# Patient Record
Sex: MALE | Race: WHITE | Employment: FULL TIME | URBAN - METROPOLITAN AREA
[De-identification: names, ages, dates, MRNs, and addresses within clinical notes are randomized per-mention and may not be internally consistent; named-entity substitution may affect disease eponyms.]

---

## 2017-03-07 ENCOUNTER — HOSPITAL ENCOUNTER (OUTPATIENT)
Dept: PHYSICAL THERAPY | Age: 32
Discharge: HOME | End: 2017-03-07
Payer: COMMERCIAL

## 2017-03-07 PROCEDURE — 97162 PT EVAL MOD COMPLEX 30 MIN: CPT

## 2017-03-07 PROCEDURE — 97110 THERAPEUTIC EXERCISES: CPT

## 2017-03-07 NOTE — PROGRESS NOTES
Guillermina Factor  : 1985 [facility]  [address], [address], [place]  [phone]   [phone]          OUTPATIENT PHYSICAL THERAPY:Initial Assessment 3/7/2017    ICD-10: Treatment Diagnosis: right leg pain (M79.604)  Right hip pain (M25.551), sprain of sacroiliac joint (M46.08)  Precautions/Allergies:   Penicillins   Fall Risk Score: 1 (? 5 = High Risk)  MD Orders: evaluate and treat MEDICAL/REFERRING DIAGNOSIS:  Right Leg Pain   Right Hip Pain    DATE OF ONSET: 2016  REFERRING PHYSICIAN: Kasia Linda MD  RETURN PHYSICIAN APPOINTMENT: not specified      INITIAL ASSESSMENT:  Mr. Jada Escoto presents with funcitonal limitations due to right hip and LE pain. Through evaluation, pt demonstrates weakness of hip and stability musculature, mild leg length discrepancy and possible innominate rotation/upslip. He would highly benefit from skilled PT to address below problems and return to regular activity level. PROBLEM LIST (Impacting functional limitations):  1. Decreased Strength  2. Decreased ADL/Functional Activities  3. Increased Pain  4. Decreased Activity Tolerance  5. Decreased Flexibility/Joint Mobility  6. Decreased [place] with Home Exercise Program INTERVENTIONS PLANNED:  1. Cold  2. Heat  3. Home Exercise Program (HEP)  4. Manual Therapy  5. Neuromuscular Re-education/Strengthening  6. Range of Motion (ROM)  7. Therapeutic Exercise/Strengthening   TREATMENT PLAN:  Effective Dates: 3/7/17 TO 17. Frequency/Duration: 2 times a week for 2-4 weeks  GOALS: (Goals have been discussed and agreed upon with patient.)  SHORT-TERM FUNCTIONAL GOALS: Time Frame: 2-4 weeks   1. Pt will be independent with HEP focusing on core stability and promoting good spinal alignment. 2. Pt will report no symptoms of LE (distal of hip joint) for 1-2 weeks demonstrating centralization of symptoms.   3. Pt will report pain level does not exceed 5/10 in a 1-2 week period of time to demonstrate pain management. DISCHARGE GOALS: Time Frame: 4-6 weeks   1. Pt will improve LEFS score by at least 10 points   2. Pt will demonstrate full AROM of right hip all planes without report of pain to improve functional mobility. 3. Pt will be able to sustain regular exercise/recreational activities including aerobic exercise 3+ times per week without increased symptoms. 4. Pt will demonstrate 5/5 strength of R hip all planes. Rehabilitation Potential For Stated Goals: Good  Regarding [facility] Bunting therapy, I certify that the treatment plan above will be carried out by a therapist or under their direction. Thank you for this referral,  Seamus Fuller PT     Referring Physician Signature: Ang John MD              Date                    The information in this section was collected on 3/7/17 (except where otherwise noted). HISTORY:   History of Present Injury/Illness (Reason for Referral):   pt reports that he began feeling right hip, groin, and leg pain October 2016 after a long back packing trip in which he was carrying heavy equipment and some times pulling equipment behind him. The pian continued over last few months and has limited his recreational and job activities. He reports he treated with massage therapy as well as chiropractic adjustments that brought temporary relief only. He was referred to orthopedics, MRI of lumbar region negative. Pt also reports testicular pain that seems to coincide with these symptoms and is currently undergoing urology work up as well. Present symptoms (on day of initial evaluation): constant pain, aching and sharp pain of right lateral hip/pelvis radiating to groin, knee and lower leg. Denies any numbness, weakness, balance disturbance or falling.    · Aggravating factors: driving, sitting, walking uneven ground, standing   · Relieving factors: rest, AM   · Pain level: 7/10 presently, 9/10 worst, 1/10 worst     Past Medical History/Comorbidities:   Mr. Michael Lowry  has a past medical history of ADD (attention deficit disorder) and Anxiety. Mr. Michael Lowry  has a past surgical history that includes cholecystectomy and lap,cholecystectomy.   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, involving driving and heavy lifting    Recreational: Mountain biking   Dominant Side:         RIGHT  Other Clinical Tests:          MRI of lumbar spine        Urology workup  Previous Treatment Approaches:          Massage therapy, chiropractic, injections     Personal Factors:          Coping Style: anxiety  Current Medications:       Current Outpatient Prescriptions:     busPIRone (BUSPAR) 7.5 mg tablet, Take 1 Tab by mouth two (2) times a day., Disp: 60 Tab, Rfl: 5   Date Last Reviewed:  3/7/2017     Number of Personal Factors/Comorbidities that affect the Plan of Care: 1-2: MODERATE COMPLEXITY   EXAMINATION:   Observation/Orthostatic Postural Assessment:    ·       Left iliac crest and PSIS elevated in standing compared to right   ·       Right LE longer than left in supine position   Palpation:    · Left PSIS more superficial than right   · Tight paraspinal and QL left   · Tender with right PSIS mobilization   · Sacrum appears symmetrical   ROM:            Lumbar spine Date:  3/7/17 Date:   Date:     Direction  Parameters Parameters Parameters   Flexion  100%     Extension  100%     Rotation  R: 100%  L: 100%     Side bending  R: 100%  L: 100%     Hip IR R: Mild limited with groin pain at end range  L: WNL     Hip ER R: WNL   L: WNL     iliospoas R: normal   L: tight      ITB R: normal   L: normal              Strength:            Lower quadrant    DATE  3/7/17 DATE     Hip flexion R: 4   L: 5 R:   L:    Hip Abduction  R: 4  L: 4+ R:   L:    Hip Extension  R: 4+  L:  4+ R:   L:    Knee Flexion  R: 5  L: 5 R:   L:    Knee Extension  R: 5  L: 5 R:   L:    Ankle Dorsiflexion  R: 5  L:5 R:   L:   Ankle Plantarflexion  R:5  L:5 R:  L:          Special Tests:          Marchers test (+) right --suggesting right SIJ stiff/dysfuncitonal    Neurological Screen:        Normal   Functional Mobility:         Gait/Ambulation:  Right ER hip during stance phase         Transfers:  Normal         Bed Mobility:  Normal         Stairs:  Normal    Body Structures Involved:  1. Joints  2. Muscles  3. Ligaments Body Functions Affected:  1. Sensory/Pain  2. Neuromusculoskeletal  3. Movement Related Activities and Participation Affected:  1. Mobility   Number of elements that affect the Plan of Care: 3: MODERATE COMPLEXITY   CLINICAL PRESENTATION:   Presentation: Evolving clinical presentation with changing clinical characteristics: MODERATE COMPLEXITY   CLINICAL DECISION MAKING:   Outcome Measure: Tool Used: Modified Oswestry Low Back Pain Questionnaire  Score:  Initial: 21/50  Most Recent: X/50 (Date: -- )   Interpretation of Score: Each section is scored on a 0-5 scale, 5 representing the greatest disability. The scores of each section are added together for a total score of 50. Medical Necessity:   · Patient is expected to demonstrate progress in strength and range of motion to increase independence with recreational and work activities . Reason for Services/Other Comments:  · Patient continues to require modification of therapeutic interventions to increase complexity of exercises.    Use of outcome tool(s) and clinical judgement create a POC that gives a: Questionable prediction of patient's progress: MODERATE COMPLEXITY            TREATMENT:   (In addition to Assessment/Re-Assessment sessions the following treatments were rendered)  Pre-treatment Symptoms/Complaints:  See above   Pain: Initial:     7/10 entire hip to lower leg Post Session:  4-6/10 localized to right hip/pelvis      THERAPEUTIC EXERCISE: (10 minutes):  Exercises per grid below to improve mobility and strength. Required moderate visual, verbal, manual and tactile cues to promote proper body alignment, promote proper body posture and promote proper body mechanics. Progressed resistance, range and repetitions as indicated. Date:  3/7/17 Date:   Date:     Activity/Exercise Parameters Parameters Parameters   shotgun 5, 10 sec holds and demo-do for home     iso hip flexion  Next visit      nustep or bike  Next visit     shuttle Next visit                        Manual:   · Release of QL B   · Manual resist to shot gun technique     Treatment/Session Assessment:  Focused on decreasing pelvic asymmetry   · Response to Treatment:  Decrease and localization of symptoms after treatment today. · Compliance with Program/Exercises: Will assess as treatment progresses. · Recommendations/Intent for next treatment session: \"Next visit will focus on advancements to more challenging activities\".     Future Appointments  Date Time Provider Vishal Rodriguez   3/9/2017 10:45 AM Maite Reach, PT [facility]   3/15/2017 9:15 AM Maite Reach, PT [facility]   3/17/2017 8:30 AM Maite Reach, PT [facility]   3/20/2017 11:00 AM Barak Silver MD [facility] PST PST   3/21/2017 10:00 AM Maite Reach, PT [facility]   3/23/2017 10:45 AM Maite Reach, PT [facility]   3/27/2017 7:45 AM Maite Reach, PT [facility]   3/30/2017 7:45 AM Maite Reach, PT [facility]       Total Treatment Duration:  PT Patient Time In/Time Out  Time In: 1045  Time Out: [address], PT

## 2017-03-07 NOTE — PROGRESS NOTES
Ambulatory/Rehab Services H2 Model Falls Risk Assessment    Risk Factor Pts. ·   Confusion/Disorientation/Impulsivity  []    4 ·   Symptomatic Depression  []   2 ·   Altered Elimination  []   1 ·   Dizziness/Vertigo  []   1 ·   Gender (Male)  [x]   1 ·   Any administered antiepileptics (anticonvulsants):  []   2 ·   Any administered benzodiazepines:  []   1 ·   Visual Impairment (specify):  []   1 ·   Portable Oxygen Use  []   1 ·   Orthostatic ? BP  []   1 ·   History of Recent Falls (within 3 mos.)  []   5     Ability to Rise from Chair (choose one) Pts. ·   Ability to rise in a single movement  [x]   0 ·   Pushes up, successful in one attempt  []   1 ·   Multiple attempts, but successful  []   3 ·   Unable to rise without assistance  []   4   Total: (5 or greater = High Risk) 0     Falls Prevention Plan:   []                Physical Limitations to Exercise (specify):   []                Mobility Assistance Device (type):   []                Exercise/Equipment Adaptation (specify):    ©2010 [facility] [employer] Patent #[id number].  Federal Law prohibits the replication, distribution or use without written permission from [facility] [employer]

## 2017-03-09 ENCOUNTER — HOSPITAL ENCOUNTER (OUTPATIENT)
Dept: PHYSICAL THERAPY | Age: 32
Discharge: HOME | End: 2017-03-09
Payer: COMMERCIAL

## 2017-03-09 PROCEDURE — 97110 THERAPEUTIC EXERCISES: CPT

## 2017-03-09 NOTE — PROGRESS NOTES
Anne Connolly  : 1985 [facility] at [facility]  [address], [address], [place] [address]  [phone]   [phone]          OUTPATIENT PHYSICAL THERAPY:Daily Note 3/9/2017    ICD-10: Treatment Diagnosis: right leg pain (M79.604)  Right hip pain (M25.551), sprain of sacroiliac joint (M46.08)  Precautions/Allergies:   Penicillins   Fall Risk Score: 1 (? 5 = High Risk)  MD Orders: evaluate and treat MEDICAL/REFERRING DIAGNOSIS:  Right Leg Pain   Right Hip Pain    DATE OF ONSET: 2016  REFERRING PHYSICIAN: Anamaria Renteria MD  RETURN PHYSICIAN APPOINTMENT: not specified      INITIAL ASSESSMENT:  Mr. Familia Case presents with funcitonal limitations due to right hip and LE pain. Through evaluation, pt demonstrates weakness of hip and stability musculature, mild leg length discrepancy and possible innominate rotation/upslip. He would highly benefit from skilled PT to address below problems and return to regular activity level. PROBLEM LIST (Impacting functional limitations):  1. Decreased Strength  2. Decreased ADL/Functional Activities  3. Increased Pain  4. Decreased Activity Tolerance  5. Decreased Flexibility/Joint Mobility  6. Decreased [place] with Home Exercise Program INTERVENTIONS PLANNED:  1. Cold  2. Heat  3. Home Exercise Program (HEP)  4. Manual Therapy  5. Neuromuscular Re-education/Strengthening  6. Range of Motion (ROM)  7. Therapeutic Exercise/Strengthening   TREATMENT PLAN:  Effective Dates: 3/7/17 TO 17. Frequency/Duration: 2 times a week for 2-4 weeks  GOALS: (Goals have been discussed and agreed upon with patient.)  SHORT-TERM FUNCTIONAL GOALS: Time Frame: 2-4 weeks   1. Pt will be independent with HEP focusing on core stability and promoting good spinal alignment. 2. Pt will report no symptoms of LE (distal of hip joint) for 1-2 weeks demonstrating centralization of symptoms.   3. Pt will report pain level does not exceed 5/10 in a 1-2 week period of time to demonstrate pain management. DISCHARGE GOALS: Time Frame: 4-6 weeks   1. Pt will improve LEFS score by at least 10 points   2. Pt will demonstrate full AROM of right hip all planes without report of pain to improve functional mobility. 3. Pt will be able to sustain regular exercise/recreational activities including aerobic exercise 3+ times per week without increased symptoms. 4. Pt will demonstrate 5/5 strength of R hip all planes. Rehabilitation Potential For Stated Goals: Good  Regarding [place] therapy, I certify that the treatment plan above will be carried out by a therapist or under their direction. Thank you for this referral,  Roberto Solorio PT     Referring Physician Signature: Una Wallace MD              Date                    The information in this section was collected on 3/7/17 (except where otherwise noted). HISTORY:   History of Present Injury/Illness (Reason for Referral):   pt reports that he began feeling right hip, groin, and leg pain October 2016 after a long back packing trip in which he was carrying heavy equipment and some times pulling equipment behind him. The pian continued over last few months and has limited his recreational and job activities. He reports he treated with massage therapy as well as chiropractic adjustments that brought temporary relief only. He was referred to orthopedics, MRI of lumbar region negative. Pt also reports testicular pain that seems to coincide with these symptoms and is currently undergoing urology work up as well. Present symptoms (on day of initial evaluation): constant pain, aching and sharp pain of right lateral hip/pelvis radiating to groin, knee and lower leg. Denies any numbness, weakness, balance disturbance or falling.    · Aggravating factors: driving, sitting, walking uneven ground, standing   · Relieving factors: rest, AM   · Pain level: 7/10 presently, 9/10 worst, 1/10 worst Past Medical History/Comorbidities:   Mr. Charlee William  has a past medical history of ADD (attention deficit disorder) and Anxiety. Mr. Charlee William  has a past surgical history that includes cholecystectomy and lap,cholecystectomy.   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, involving driving and heavy lifting    Recreational: Mountain biking   Dominant Side:         RIGHT  Other Clinical Tests:          MRI of lumbar spine        Urology workup  Previous Treatment Approaches:          Massage therapy, chiropractic, injections     Personal Factors:          Coping Style: anxiety  Current Medications:       Current Outpatient Prescriptions:     busPIRone (BUSPAR) 7.5 mg tablet, Take 1 Tab by mouth two (2) times a day., Disp: 60 Tab, Rfl: 5   Date Last Reviewed:  3/9/2017     Number of Personal Factors/Comorbidities that affect the Plan of Care: 1-2: MODERATE COMPLEXITY   EXAMINATION:   Observation/Orthostatic Postural Assessment:    ·       Left iliac crest and PSIS elevated in standing compared to right   ·       Right LE longer than left in supine position   Palpation:    · Left PSIS more superficial than right   · Tight paraspinal and QL left   · Tender with right PSIS mobilization   · Sacrum appears symmetrical   ROM:            Lumbar spine Date:  3/7/17 Date:   Date:     Direction  Parameters Parameters Parameters   Flexion  100%     Extension  100%     Rotation  R: 100%  L: 100%     Side bending  R: 100%  L: 100%     Hip IR R: Mild limited with groin pain at end range  L: WNL     Hip ER R: WNL   L: WNL     iliospoas R: normal   L: tight      ITB R: normal   L: normal              Strength:            Lower quadrant    DATE  3/7/17 DATE     Hip flexion R: 4   L: 5 R:   L:    Hip Abduction  R: 4  L: 4+ R:   L:    Hip Extension  R: 4+  L:  4+ R:   L:    Knee Flexion R: 5  L: 5 R:   L:    Knee Extension  R: 5  L: 5 R:   L:    Ankle Dorsiflexion  R: 5  L:5 R:   L:   Ankle Plantarflexion  R:5  L:5 R:  L:          Special Tests:          Marchers test (+) right --suggesting right SIJ stiff/dysfuncitonal    Neurological Screen:        Normal   Functional Mobility:         Gait/Ambulation:  Right ER hip during stance phase         Transfers:  Normal         Bed Mobility:  Normal         Stairs:  Normal    Body Structures Involved:  1. Joints  2. Muscles  3. Ligaments Body Functions Affected:  1. Sensory/Pain  2. Neuromusculoskeletal  3. Movement Related Activities and Participation Affected:  1. Mobility   Number of elements that affect the Plan of Care: 3: MODERATE COMPLEXITY   CLINICAL PRESENTATION:   Presentation: Evolving clinical presentation with changing clinical characteristics: MODERATE COMPLEXITY   CLINICAL DECISION MAKING:   Outcome Measure: Tool Used: Modified Oswestry Low Back Pain Questionnaire  Score:  Initial: 21/50  Most Recent: X/50 (Date: -- )   Interpretation of Score: Each section is scored on a 0-5 scale, 5 representing the greatest disability. The scores of each section are added together for a total score of 50. Medical Necessity:   · Patient is expected to demonstrate progress in strength and range of motion to increase independence with recreational and work activities . Reason for Services/Other Comments:  · Patient continues to require modification of therapeutic interventions to increase complexity of exercises.    Use of outcome tool(s) and clinical judgement create a POC that gives a: Questionable prediction of patient's progress: MODERATE COMPLEXITY            TREATMENT:   (In addition to Assessment/Re-Assessment sessions the following treatments were rendered)  Pre-treatment Symptoms/Complaints: less pain and more localized since last session      Pain: Initial:     3-5/10 entire hip to knee  Post Session:  0/10 at end of session THERAPEUTIC EXERCISE: (40 minutes):  Exercises per grid below to improve mobility and strength. Required moderate visual, verbal, manual and tactile cues to promote proper body alignment, promote proper body posture and promote proper body mechanics. Progressed resistance, range and repetitions as indicated. Date:  3/7/17 Date:  3/9/17 Date:     Activity/Exercise Parameters Parameters Parameters   shotgun 5, 10 sec holds and demo-do for home Home     iso hip flexion  Next visit  3 x 20 sec     nustep or bike  Next visit Bike x 10 min level 2     shuttle Next visit  Next     Right knee to chest /left leg off table   With manual resist to correct rotation of pelvis     Side gliding left standing   3 x 3 sec hold     Bridge with ball squeeze   X 5, 10 sec hold     Manual:   · None performed    Modalities; Ice x 10 min low back/SIJ  Treatment/Session Assessment:  Focused on decreasing pelvic asymmetry and stabilization exercises   · Response to Treatment:  Decrease and localization of symptoms after treatment today. · Compliance with Program/Exercises: Will assess as treatment progresses. · Recommendations/Intent for next treatment session: \"Next visit will focus on advancements to more challenging activities\".     Future Appointments  Date Time Provider Vishal Rodriguez   3/15/2017 9:15 AM Cecil Maldonado, PT [facility]   3/17/2017 8:30 AM Skiberry Maldonado, PT [facility]   3/20/2017 11:00 AM Raymundo Castillo MD [facility] PST PST   3/21/2017 10:00 AM Skilydiay Maldonado, PT [facility]   3/23/2017 10:45 AM Skippy Maldonado, PT [facility]   3/27/2017 7:45 AM Skilydiay Maldonado, PT [facility]   3/30/2017 7:45 AM Skilydiay Maldonado, PT [facility]       Total Treatment Duration:  PT Patient Time In/Time Out  Time In: 1045  Time Out: [address], PT

## 2017-03-15 ENCOUNTER — HOSPITAL ENCOUNTER (OUTPATIENT)
Dept: PHYSICAL THERAPY | Age: 32
Discharge: HOME | End: 2017-03-15
Payer: COMMERCIAL

## 2017-03-15 PROCEDURE — 97110 THERAPEUTIC EXERCISES: CPT

## 2017-03-15 PROCEDURE — 97140 MANUAL THERAPY 1/> REGIONS: CPT

## 2017-03-15 NOTE — PROGRESS NOTES
Betzy Mac  : 1985 [facility]  [address], [address], [place]  [phone]   [phone]          OUTPATIENT PHYSICAL THERAPY:Daily Note 3/15/2017    ICD-10: Treatment Diagnosis: right leg pain (M79.604)  Right hip pain (M25.551), sprain of sacroiliac joint (M46.08)  Precautions/Allergies:   Penicillins   Fall Risk Score: 1 (? 5 = High Risk)  MD Orders: evaluate and treat MEDICAL/REFERRING DIAGNOSIS:  Right Leg Pain   Right Hip Pain    DATE OF ONSET: 2016  REFERRING PHYSICIAN: Ramirez Cabrera MD  RETURN PHYSICIAN APPOINTMENT: not specified      INITIAL ASSESSMENT:  Mr. Ya Felix presents with funcitonal limitations due to right hip and LE pain. Through evaluation, pt demonstrates weakness of hip and stability musculature, mild leg length discrepancy and possible innominate rotation/upslip. He would highly benefit from skilled PT to address below problems and return to regular activity level. PROBLEM LIST (Impacting functional limitations):  1. Decreased Strength  2. Decreased ADL/Functional Activities  3. Increased Pain  4. Decreased Activity Tolerance  5. Decreased Flexibility/Joint Mobility  6. Decreased [place] with Home Exercise Program INTERVENTIONS PLANNED:  1. Cold  2. Heat  3. Home Exercise Program (HEP)  4. Manual Therapy  5. Neuromuscular Re-education/Strengthening  6. Range of Motion (ROM)  7. Therapeutic Exercise/Strengthening   TREATMENT PLAN:  Effective Dates: 3/7/17 TO 17. Frequency/Duration: 2 times a week for 2-4 weeks  GOALS: (Goals have been discussed and agreed upon with patient.)  SHORT-TERM FUNCTIONAL GOALS: Time Frame: 2-4 weeks   1. Pt will be independent with HEP focusing on core stability and promoting good spinal alignment. 2. Pt will report no symptoms of LE (distal of hip joint) for 1-2 weeks demonstrating centralization of symptoms.   3. Pt will report pain level does not exceed 5/10 in a 1-2 week period of time to demonstrate pain management. DISCHARGE GOALS: Time Frame: 4-6 weeks   1. Pt will improve LEFS score by at least 10 points   2. Pt will demonstrate full AROM of right hip all planes without report of pain to improve functional mobility. 3. Pt will be able to sustain regular exercise/recreational activities including aerobic exercise 3+ times per week without increased symptoms. 4. Pt will demonstrate 5/5 strength of R hip all planes. Rehabilitation Potential For Stated Goals: Good  Regarding [facility] Section therapy, I certify that the treatment plan above will be carried out by a therapist or under their direction. Thank you for this referral,  Shane Cannon PT     Referring Physician Signature: Latanya Orr MD              Date                    The information in this section was collected on 3/7/17 (except where otherwise noted). HISTORY:   History of Present Injury/Illness (Reason for Referral):   pt reports that he began feeling right hip, groin, and leg pain October 2016 after a long back packing trip in which he was carrying heavy equipment and some times pulling equipment behind him. The pian continued over last few months and has limited his recreational and job activities. He reports he treated with massage therapy as well as chiropractic adjustments that brought temporary relief only. He was referred to orthopedics, MRI of lumbar region negative. Pt also reports testicular pain that seems to coincide with these symptoms and is currently undergoing urology work up as well. Present symptoms (on day of initial evaluation): constant pain, aching and sharp pain of right lateral hip/pelvis radiating to groin, knee and lower leg. Denies any numbness, weakness, balance disturbance or falling.    · Aggravating factors: driving, sitting, walking uneven ground, standing   · Relieving factors: rest, AM   · Pain level: 7/10 presently, 9/10 worst, 1/10 worst Past Medical History/Comorbidities:   Mr. Lauro Moreno  has a past medical history of ADD (attention deficit disorder) and Anxiety. Mr. Lauro Moreno  has a past surgical history that includes cholecystectomy and lap,cholecystectomy.   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, involving driving and heavy lifting    Recreational: Mountain biking   Dominant Side:         RIGHT  Other Clinical Tests:          MRI of lumbar spine        Urology workup  Previous Treatment Approaches:          Massage therapy, chiropractic, injections     Personal Factors:          Coping Style: anxiety  Current Medications:       Current Outpatient Prescriptions:     busPIRone (BUSPAR) 7.5 mg tablet, Take 1 Tab by mouth two (2) times a day., Disp: 60 Tab, Rfl: 5   Date Last Reviewed:  3/15/2017     EXAMINATION:   Observation/Orthostatic Postural Assessment:    ·       Left iliac crest and PSIS elevated in standing compared to right   ·       Right LE longer than left in supine position   Palpation:    · Left PSIS more superficial than right   · Tight paraspinal and QL left   · Tender with right PSIS mobilization   · Sacrum appears symmetrical   ROM:            Lumbar spine Date:  3/7/17 Date:   Date:     Direction  Parameters Parameters Parameters   Flexion  100%     Extension  100%     Rotation  R: 100%  L: 100%     Side bending  R: 100%  L: 100%     Hip IR R: Mild limited with groin pain at end range  L: WNL     Hip ER R: WNL   L: WNL     iliospoas R: normal   L: tight      ITB R: normal   L: normal              Strength:            Lower quadrant    DATE  3/7/17 DATE     Hip flexion R: 4   L: 5 R:   L:    Hip Abduction  R: 4  L: 4+ R:   L:    Hip Extension  R: 4+  L:  4+ R:   L:    Knee Flexion  R: 5  L: 5 R:   L:    Knee Extension  R: 5  L: 5 R:   L:    Ankle Dorsiflexion  R: 5  L:5 R:   L: Ankle Plantarflexion  R:5  L:5 R:  L:          Special Tests:          Marchers test (+) right --suggesting right SIJ stiff/dysfuncitonal    Neurological Screen:        Normal   Functional Mobility:         Gait/Ambulation:  Right ER hip during stance phase         Transfers:  Normal         Bed Mobility:  Normal         Stairs:  Normal    CLINICAL DECISION MAKING:   Outcome Measure: Tool Used: Modified Oswestry Low Back Pain Questionnaire  Score:  Initial: 21/50  Most Recent: X/50 (Date: -- )   Interpretation of Score: Each section is scored on a 0-5 scale, 5 representing the greatest disability. The scores of each section are added together for a total score of 50. Medical Necessity:   · Patient is expected to demonstrate progress in strength and range of motion to increase independence with recreational and work activities . Reason for Services/Other Comments:  · Patient continues to require modification of therapeutic interventions to increase complexity of exercises. TREATMENT:   (In addition to Assessment/Re-Assessment sessions the following treatments were rendered)  Pre-treatment Symptoms/Complaints:pt states he drove to [place] and had increased pain after 30 min of driving to a 0/39 of right hip, thigh and knee. Pain: Initial:     4/10 entire hip to knee  Post Session:  1/10 at end of session      THERAPEUTIC EXERCISE: (30 minutes):  Exercises per grid below to improve mobility and strength. Required moderate visual, verbal, manual and tactile cues to promote proper body alignment, promote proper body posture and promote proper body mechanics. Progressed resistance, range and repetitions as indicated.    Date:  3/7/17 Date:  3/9/17 Date:  3/15/17   Activity/Exercise Parameters Parameters Parameters   shotgun 5, 10 sec holds and demo-do for home Home  HEP    iso hip flexion  Next visit  3 x 20 sec  3 x 20 sec    nustep or bike  Next visit Bike x 10 min level 2  Bike x 10 min level 3   shuttle Next visit  Next  Next    Right knee to chest /left leg off table   With manual resist to correct rotation of pelvis  See manual    Side gliding left standing   3 x 3 sec hold  ----hep---   Bridge with ball squeeze   X 5, 10 sec hold  5 x 10 sec    Foot tap over bolster (SLS balance)   Lateral x 10 B  forwd x 20 B    Side stepping with band   GTB 1 lap    Manual: 15 min   · Grade III medial hip rotation x 5 bouts   · Contract relax innominate correction (to correct right anterior rotation) 3 x 5 sec, 2 sets    Modalities; Ice x 10 min low back/SIJ    Treatment/Session Assessment:  Focused on decreasing pelvic asymmetry and stabilization exercises   · Response to Treatment:  No pain at end of session. leg length symmetrical after manual   · Compliance with Program/Exercises: Will assess as treatment progresses. · Recommendations/Intent for next treatment session: \"Next visit will focus on advancements to more challenging activities\".     Future Appointments  Date Time Provider Vishal Rodriguez   3/17/2017 8:30 AM Patricia Swanson, PT [facility]   3/20/2017 11:00 AM MD ABEBA Moulton PST PST   3/21/2017 7:45 AM Elinore Bump, PT [facility]   3/24/2017 8:30 AM Elianibalre Kiki, PT [facility]   3/27/2017 7:45 AM Elianibalre Bump, PT [facility]   3/30/2017 7:45 AM Elianibalre Normamp, PT [facility]       Total Treatment Duration:  PT Patient Time In/Time Out  Time In: 0915  Time Out: [address] [facility], PT

## 2017-03-17 ENCOUNTER — HOSPITAL ENCOUNTER (OUTPATIENT)
Dept: PHYSICAL THERAPY | Age: 32
Discharge: HOME | End: 2017-03-17
Payer: COMMERCIAL

## 2017-03-17 PROCEDURE — 97140 MANUAL THERAPY 1/> REGIONS: CPT

## 2017-03-17 PROCEDURE — 97110 THERAPEUTIC EXERCISES: CPT

## 2017-03-17 NOTE — PROGRESS NOTES
Torie Walis  : 1985 [facility]  [address], [address], [place]  [phone]   [phone]          OUTPATIENT PHYSICAL THERAPY:Daily Note 3/17/2017    ICD-10: Treatment Diagnosis: right leg pain (M79.604)  Right hip pain (M25.551), sprain of sacroiliac joint (M46.08)  Precautions/Allergies:   Penicillins   Fall Risk Score: 1 (? 5 = High Risk)  MD Orders: evaluate and treat MEDICAL/REFERRING DIAGNOSIS:  Right Leg Pain   Right Hip Pain    DATE OF ONSET: 2016  REFERRING PHYSICIAN: Eliana Rodarte MD  RETURN PHYSICIAN APPOINTMENT: not specified      INITIAL ASSESSMENT:  Mr. Sebas Hanna presents with funcitonal limitations due to right hip and LE pain. Through evaluation, pt demonstrates weakness of hip and stability musculature, mild leg length discrepancy and possible innominate rotation/upslip. He would highly benefit from skilled PT to address below problems and return to regular activity level. PROBLEM LIST (Impacting functional limitations):  1. Decreased Strength  2. Decreased ADL/Functional Activities  3. Increased Pain  4. Decreased Activity Tolerance  5. Decreased Flexibility/Joint Mobility  6. Decreased [place] with Home Exercise Program INTERVENTIONS PLANNED:  1. Cold  2. Heat  3. Home Exercise Program (HEP)  4. Manual Therapy  5. Neuromuscular Re-education/Strengthening  6. Range of Motion (ROM)  7. Therapeutic Exercise/Strengthening   TREATMENT PLAN:  Effective Dates: 3/7/17 TO 17. Frequency/Duration: 2 times a week for 2-4 weeks  GOALS: (Goals have been discussed and agreed upon with patient.)  SHORT-TERM FUNCTIONAL GOALS: Time Frame: 2-4 weeks   1. Pt will be independent with HEP focusing on core stability and promoting good spinal alignment. 2. Pt will report no symptoms of LE (distal of hip joint) for 1-2 weeks demonstrating centralization of symptoms.   3. Pt will report pain level does not exceed 5/10 in a 1-2 week period of time to demonstrate pain management. DISCHARGE GOALS: Time Frame: 4-6 weeks   1. Pt will improve LEFS score by at least 10 points   2. Pt will demonstrate full AROM of right hip all planes without report of pain to improve functional mobility. 3. Pt will be able to sustain regular exercise/recreational activities including aerobic exercise 3+ times per week without increased symptoms. 4. Pt will demonstrate 5/5 strength of R hip all planes. Rehabilitation Potential For Stated Goals: Good  Regarding Mary Rg therapy, I certify that the treatment plan above will be carried out by a therapist or under their direction. Thank you for this referral,  Amber Miller, PT     Referring Physician Signature: Roosevelt Valenzuela MD              Date                    The information in this section was collected on 3/7/17 (except where otherwise noted). HISTORY:   History of Present Injury/Illness (Reason for Referral):   pt reports that he began feeling right hip, groin, and leg pain October 2016 after a long back packing trip in which he was carrying heavy equipment and some times pulling equipment behind him. The pian continued over last few months and has limited his recreational and job activities. He reports he treated with massage therapy as well as chiropractic adjustments that brought temporary relief only. He was referred to orthopedics, MRI of lumbar region negative. Pt also reports testicular pain that seems to coincide with these symptoms and is currently undergoing urology work up as well. Present symptoms (on day of initial evaluation): constant pain, aching and sharp pain of right lateral hip/pelvis radiating to groin, knee and lower leg. Denies any numbness, weakness, balance disturbance or falling.    · Aggravating factors: driving, sitting, walking uneven ground, standing   · Relieving factors: rest, AM   · Pain level: 7/10 presently, 9/10 worst, 1/10 worst Past Medical History/Comorbidities:   Mr. Fahad Dyson  has a past medical history of ADD (attention deficit disorder) and Anxiety. Mr. Fahad Dyson  has a past surgical history that includes cholecystectomy and lap,cholecystectomy.   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, involving driving and heavy lifting    Recreational: Mountain biking   Dominant Side:         RIGHT  Other Clinical Tests:          MRI of lumbar spine        Urology workup  Previous Treatment Approaches:          Massage therapy, chiropractic, injections     Personal Factors:          Coping Style: anxiety  Current Medications:       Current Outpatient Prescriptions:     busPIRone (BUSPAR) 7.5 mg tablet, Take 1 Tab by mouth two (2) times a day., Disp: 60 Tab, Rfl: 5   Date Last Reviewed:  3/17/2017     EXAMINATION:   Observation/Orthostatic Postural Assessment:    ·       Left iliac crest and PSIS elevated in standing compared to right   ·       Right LE longer than left in supine position   Palpation:    · Left PSIS more superficial than right   · Tight paraspinal and QL left   · Tender with right PSIS mobilization   · Sacrum appears symmetrical   ROM:            Lumbar spine Date:  3/7/17 Date:   Date:     Direction  Parameters Parameters Parameters   Flexion  100%     Extension  100%     Rotation  R: 100%  L: 100%     Side bending  R: 100%  L: 100%     Hip IR R: Mild limited with groin pain at end range  L: WNL     Hip ER R: WNL   L: WNL     iliospoas R: normal   L: tight      ITB R: normal   L: normal              Strength:            Lower quadrant    DATE  3/7/17 DATE     Hip flexion R: 4   L: 5 R:   L:    Hip Abduction  R: 4  L: 4+ R:   L:    Hip Extension  R: 4+  L:  4+ R:   L:    Knee Flexion  R: 5  L: 5 R:   L:    Knee Extension  R: 5  L: 5 R:   L:    Ankle Dorsiflexion  R: 5  L:5 R:   L: Ankle Plantarflexion  R:5  L:5 R:  L:          Special Tests:          Marchers test (+) right --suggesting right SIJ stiff/dysfuncitonal    Neurological Screen:        Normal   Functional Mobility:         Gait/Ambulation:  Right ER hip during stance phase         Transfers:  Normal         Bed Mobility:  Normal         Stairs:  Normal    CLINICAL DECISION MAKING:   Outcome Measure: Tool Used: Modified Oswestry Low Back Pain Questionnaire  Score:  Initial: 21/50  Most Recent: X/50 (Date: -- )   Interpretation of Score: Each section is scored on a 0-5 scale, 5 representing the greatest disability. The scores of each section are added together for a total score of 50. Medical Necessity:   · Patient is expected to demonstrate progress in strength and range of motion to increase independence with recreational and work activities . Reason for Services/Other Comments:  · Patient continues to require modification of therapeutic interventions to increase complexity of exercises. TREATMENT:   (In addition to Assessment/Re-Assessment sessions the following treatments were rendered)  Pre-treatment Symptoms/Complaints:pt states he felt good the rest of the day after last session but he drove around town yesterday and pain increased to a 6/10. Pain: Initial:     5/10 entire hip to knee  Post Session:  1/10 at end of session      THERAPEUTIC EXERCISE: (10 minutes):  Exercises per grid below to improve mobility and strength. Required moderate visual, verbal, manual and tactile cues to promote proper body alignment, promote proper body posture and promote proper body mechanics. Progressed resistance, range and repetitions as indicated.    Date:  3/7/17 Date:  3/9/17 Date:  3/15/17 3/17/17   Activity/Exercise Parameters Parameters Parameters    shotgun 5, 10 sec holds and demo-do for home Home  HEP     iso hip flexion  Next visit  3 x 20 sec  3 x 20 sec     nustep or bike  Next visit Bike x 10 min level 2  Bike x 10 min level 3 Bike x 10 min level 3    shuttle Next visit  Next  Next     Right knee to chest /left leg off table   With manual resist to correct rotation of pelvis  See manual     Side gliding left standing   3 x 3 sec hold  ----hep---    Bridge with ball squeeze   X 5, 10 sec hold  5 x 10 sec     Foot tap over bolster (SLS balance)   Lateral x 10 B  forwd x 20 B     Side stepping with band   GTB 1 lap     Standing rotation correction     Right foot on table    Manual: 30 min   · Left iliopsoas release,   · grade IV PA's to left PSIS   · Grade IV CPA's and left UPA to lower lumbar   · Long distraction to left LE to decrease up slip  · Deep MFR to left QL      Modalities; Ice x 10 min low back/SIJ    Treatment/Session Assessment:  Pt with left up slip and posterior rotation, reduced with manual (symmetrical)  · Response to Treatment:  min pain at end of session. Pelvis symmetrical after manual   · Compliance with Program/Exercises: Will assess as treatment progresses. · Recommendations/Intent for next treatment session: \"Next visit will focus on advancements to more challenging activities\".     Future Appointments  Date Time Provider Vishal Rodriguez   3/21/2017 7:45 AM EDMUND Cornejo   3/24/2017 8:30 AM EDMUND Cornejo   3/27/2017 7:45 AM EDMUND Cornejo   3/27/2017 9:10 AM MD ABEBA Moulton PST PST   3/30/2017 7:45 AM Patricia Swanson PT [facility]       Total Treatment Duration:  PT Patient Time In/Time Out  Time In: 0830  Time Out: [facility]

## 2017-03-21 ENCOUNTER — HOSPITAL ENCOUNTER (OUTPATIENT)
Dept: PHYSICAL THERAPY | Age: 32
Discharge: HOME | End: 2017-03-21
Payer: COMMERCIAL

## 2017-03-21 PROCEDURE — 97110 THERAPEUTIC EXERCISES: CPT

## 2017-03-21 PROCEDURE — 97140 MANUAL THERAPY 1/> REGIONS: CPT

## 2017-03-21 NOTE — PROGRESS NOTES
Amanda Frederick  : 1985 [facility]  [address], [address], [place]  [phone]   [phone]          OUTPATIENT PHYSICAL THERAPY:Daily Note 3/21/2017    ICD-10: Treatment Diagnosis: right leg pain (M79.604)  Right hip pain (M25.551), sprain of sacroiliac joint (M46.08)  Precautions/Allergies:   Penicillins   Fall Risk Score: 1 (? 5 = High Risk)  MD Orders: evaluate and treat MEDICAL/REFERRING DIAGNOSIS:  Right Leg Pain   Right Hip Pain    DATE OF ONSET: 2016  REFERRING PHYSICIAN: Roosevelt Valenzuela MD  RETURN PHYSICIAN APPOINTMENT: not specified      INITIAL ASSESSMENT:  Mr. Fahad Dyson presents with funcitonal limitations due to right hip and LE pain. Through evaluation, pt demonstrates weakness of hip and stability musculature, mild leg length discrepancy and possible innominate rotation/upslip. He would highly benefit from skilled PT to address below problems and return to regular activity level. PROBLEM LIST (Impacting functional limitations):  1. Decreased Strength  2. Decreased ADL/Functional Activities  3. Increased Pain  4. Decreased Activity Tolerance  5. Decreased Flexibility/Joint Mobility  6. Decreased [place] with Home Exercise Program INTERVENTIONS PLANNED:  1. Cold  2. Heat  3. Home Exercise Program (HEP)  4. Manual Therapy  5. Neuromuscular Re-education/Strengthening  6. Range of Motion (ROM)  7. Therapeutic Exercise/Strengthening   TREATMENT PLAN:  Effective Dates: 3/7/17 TO 17. Frequency/Duration: 2 times a week for 2-4 weeks  GOALS: (Goals have been discussed and agreed upon with patient.)  SHORT-TERM FUNCTIONAL GOALS: Time Frame: 2-4 weeks   1. Pt will be independent with HEP focusing on core stability and promoting good spinal alignment. 2. Pt will report no symptoms of LE (distal of hip joint) for 1-2 weeks demonstrating centralization of symptoms.   3. Pt will report pain level does not exceed 5/10 in a 1-2 week period of time to demonstrate pain management. DISCHARGE GOALS: Time Frame: 4-6 weeks   1. Pt will improve LEFS score by at least 10 points   2. Pt will demonstrate full AROM of right hip all planes without report of pain to improve functional mobility. 3. Pt will be able to sustain regular exercise/recreational activities including aerobic exercise 3+ times per week without increased symptoms. 4. Pt will demonstrate 5/5 strength of R hip all planes. Rehabilitation Potential For Stated Goals: Good  Regarding Nadia Crutch therapy, I certify that the treatment plan above will be carried out by a therapist or under their direction. Thank you for this referral,  Juan Jarquin PT     Referring Physician Signature: Yovanny Doyle MD              Date                    The information in this section was collected on 3/7/17 (except where otherwise noted). HISTORY:   History of Present Injury/Illness (Reason for Referral):   pt reports that he began feeling right hip, groin, and leg pain October 2016 after a long back packing trip in which he was carrying heavy equipment and some times pulling equipment behind him. The pian continued over last few months and has limited his recreational and job activities. He reports he treated with massage therapy as well as chiropractic adjustments that brought temporary relief only. He was referred to orthopedics, MRI of lumbar region negative. Pt also reports testicular pain that seems to coincide with these symptoms and is currently undergoing urology work up as well. Present symptoms (on day of initial evaluation): constant pain, aching and sharp pain of right lateral hip/pelvis radiating to groin, knee and lower leg. Denies any numbness, weakness, balance disturbance or falling.    · Aggravating factors: driving, sitting, walking uneven ground, standing   · Relieving factors: rest, AM   · Pain level: 7/10 presently, 9/10 worst, 1/10 worst Past Medical History/Comorbidities:   Mr. Bob Daigle  has a past medical history of ADD (attention deficit disorder) and Anxiety. Mr. Bob Daigle  has a past surgical history that includes cholecystectomy and lap,cholecystectomy.   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, involving driving and heavy lifting    Recreational: Mountain biking   Dominant Side:         RIGHT  Other Clinical Tests:          MRI of lumbar spine        Urology workup  Previous Treatment Approaches:          Massage therapy, chiropractic, injections     Personal Factors:          Coping Style: anxiety  Current Medications:       Current Outpatient Prescriptions:     busPIRone (BUSPAR) 7.5 mg tablet, Take 1 Tab by mouth two (2) times a day., Disp: 60 Tab, Rfl: 5   Date Last Reviewed:  3/21/2017     EXAMINATION:   Observation/Orthostatic Postural Assessment:    ·       Left iliac crest and PSIS elevated in standing compared to right   ·       Right LE longer than left in supine position   Palpation:    · Left PSIS more superficial than right   · Tight paraspinal and QL left   · Tender with right PSIS mobilization   · Sacrum appears symmetrical   ROM:            Lumbar spine Date:  3/7/17 Date:   Date:     Direction  Parameters Parameters Parameters   Flexion  100%     Extension  100%     Rotation  R: 100%  L: 100%     Side bending  R: 100%  L: 100%     Hip IR R: Mild limited with groin pain at end range  L: WNL     Hip ER R: WNL   L: WNL     iliospoas R: normal   L: tight      ITB R: normal   L: normal              Strength:            Lower quadrant    DATE  3/7/17 DATE     Hip flexion R: 4   L: 5 R:   L:    Hip Abduction  R: 4  L: 4+ R:   L:    Hip Extension  R: 4+  L:  4+ R:   L:    Knee Flexion  R: 5  L: 5 R:   L:    Knee Extension  R: 5  L: 5 R:   L:    Ankle Dorsiflexion  R: 5  L:5 R:   L: Ankle Plantarflexion  R:5  L:5 R:  L:          Special Tests:          Marchers test (+) right --suggesting right SIJ stiff/dysfuncitonal    Neurological Screen:        Normal   Functional Mobility:         Gait/Ambulation:  Right ER hip during stance phase         Transfers:  Normal         Bed Mobility:  Normal         Stairs:  Normal    CLINICAL DECISION MAKING:   Outcome Measure: Tool Used: Modified Oswestry Low Back Pain Questionnaire  Score:  Initial: 21/50  Most Recent: X/50 (Date: -- )   Interpretation of Score: Each section is scored on a 0-5 scale, 5 representing the greatest disability. The scores of each section are added together for a total score of 50. Medical Necessity:   · Patient is expected to demonstrate progress in strength and range of motion to increase independence with recreational and work activities . Reason for Services/Other Comments:  · Patient continues to require modification of therapeutic interventions to increase complexity of exercises. TREATMENT:   (In addition to Assessment/Re-Assessment sessions the following treatments were rendered)  Pre-treatment Symptoms/Complaints:pt states he felt good the rest of the day after last session but pain returned and now with intermittent numbness of plantar surfaces of B feet mainly after walking and standing prolonged. Pain: Initial:     3/10 entire right hip to knee  Post Session:  1/10 at end of session      THERAPEUTIC EXERCISE: (15 minutes):  Exercises per grid below to improve mobility and strength. Required moderate visual, verbal, manual and tactile cues to promote proper body alignment, promote proper body posture and promote proper body mechanics. Progressed resistance, range and repetitions as indicated.    Date:  3/7/17 Date:  3/9/17 Date:  3/15/17 3/17/17 3/21/17   Activity/Exercise Parameters Parameters Parameters     shotgun 5, 10 sec holds and demo-do for home Home  HEP      iso hip flexion Next visit  3 x 20 sec  3 x 20 sec      nustep or bike  Next visit Bike x 10 min level 2  Bike x 10 min level 3 Bike x 10 min level 3  airdyne x 10 min    shuttle Next visit  Next  Next      Right knee to chest /left leg off table   With manual resist to correct rotation of pelvis  See manual      Side gliding left standing   3 x 3 sec hold  ----hep---     Bridge with ball squeeze   X 5, 10 sec hold  5 x 10 sec      Foot tap over bolster (SLS balance)   Lateral x 10 B  forwd x 20 B      Side stepping with band   GTB 1 lap      Standing rotation correction     Right foot on table     Prone press up     X 10, 5 sec holds    Manual: 40 min   · grade IV PA's to left PSIS   · Grade IV CPA's and left UPA to lower lumbar   · Long distraction to left LE to decrease up slip  · Deep MFR to left QL   · Right grade IV hip IR     · Kinesio tape to SIJs    Modalities;  none today     Treatment/Session Assessment:  Pt with left up slip and posterior rotation, reduced with manual (symmetrical)  · Response to Treatment:  min pain at end of session. Pelvis symmetrical after manual   · Compliance with Program/Exercises: Will assess as treatment progresses. · Recommendations/Intent for next treatment session: \"Next visit will focus on advancements to more challenging activities\".     Future Appointments  Date Time Provider Vishal Rodriguez   3/24/2017 8:30 AM EDMUND Paulino [facility]   3/27/2017 7:45 AM EDMUND Paulino   3/27/2017 9:10 AM Kenya Galindo MD [facility] PST PST   [facility]30[facility]2017 [facility]       Total Treatment Duration:  PT Patient Time In/Time Out  Time In: 5769  Time Out: EDMUND Webster

## 2017-03-23 ENCOUNTER — APPOINTMENT (OUTPATIENT)
Dept: PHYSICAL THERAPY | Age: 32
End: 2017-03-23
Payer: COMMERCIAL

## 2017-03-24 ENCOUNTER — HOSPITAL ENCOUNTER (OUTPATIENT)
Dept: PHYSICAL THERAPY | Age: 32
Discharge: HOME | End: 2017-03-24
Payer: COMMERCIAL

## 2017-03-24 PROCEDURE — 97110 THERAPEUTIC EXERCISES: CPT

## 2017-03-24 PROCEDURE — 97012 MECHANICAL TRACTION THERAPY: CPT

## 2017-03-24 NOTE — PROGRESS NOTES
Licha Venegas  : 1985 [facility]  [address], [address], [place]  [phone]   [phone]          OUTPATIENT PHYSICAL THERAPY:Daily Note 3/24/2017    ICD-10: Treatment Diagnosis: right leg pain (M79.604)  Right hip pain (M25.551), sprain of sacroiliac joint (M46.08)  Precautions/Allergies:   Penicillins   Fall Risk Score: 1 (? 5 = High Risk)  MD Orders: evaluate and treat MEDICAL/REFERRING DIAGNOSIS:  Right Leg Pain   Right Hip Pain    DATE OF ONSET: 2016  REFERRING PHYSICIAN: Catherine Alfaro MD  RETURN PHYSICIAN APPOINTMENT: not specified      INITIAL ASSESSMENT:  Mr. Willis Contreras presents with funcitonal limitations due to right hip and LE pain. Through evaluation, pt demonstrates weakness of hip and stability musculature, mild leg length discrepancy and possible innominate rotation/upslip. He would highly benefit from skilled PT to address below problems and return to regular activity level. PROBLEM LIST (Impacting functional limitations):  1. Decreased Strength  2. Decreased ADL/Functional Activities  3. Increased Pain  4. Decreased Activity Tolerance  5. Decreased Flexibility/Joint Mobility  6. Decreased [place] with Home Exercise Program INTERVENTIONS PLANNED:  1. Cold  2. Heat  3. Home Exercise Program (HEP)  4. Manual Therapy  5. Neuromuscular Re-education/Strengthening  6. Range of Motion (ROM)  7. Therapeutic Exercise/Strengthening   TREATMENT PLAN:  Effective Dates: 3/7/17 TO 17. Frequency/Duration: 2 times a week for 2-4 weeks  GOALS: (Goals have been discussed and agreed upon with patient.)  SHORT-TERM FUNCTIONAL GOALS: Time Frame: 2-4 weeks   1. Pt will be independent with HEP focusing on core stability and promoting good spinal alignment. 2. Pt will report no symptoms of LE (distal of hip joint) for 1-2 weeks demonstrating centralization of symptoms.   3. Pt will report pain level does not exceed 5/10 in a 1-2 week period of time to demonstrate pain management. DISCHARGE GOALS: Time Frame: 4-6 weeks   1. Pt will improve LEFS score by at least 10 points   2. Pt will demonstrate full AROM of right hip all planes without report of pain to improve functional mobility. 3. Pt will be able to sustain regular exercise/recreational activities including aerobic exercise 3+ times per week without increased symptoms. 4. Pt will demonstrate 5/5 strength of R hip all planes. Rehabilitation Potential For Stated Goals: Good  Regarding Ceola Lick therapy, I certify that the treatment plan above will be carried out by a therapist or under their direction. Thank you for this referral,  Kalpesh Bravo PT     Referring Physician Signature: Julianne Cranker, MD              Date                    The information in this section was collected on 3/7/17 (except where otherwise noted). HISTORY:   History of Present Injury/Illness (Reason for Referral):   pt reports that he began feeling right hip, groin, and leg pain October 2016 after a long back packing trip in which he was carrying heavy equipment and some times pulling equipment behind him. The pian continued over last few months and has limited his recreational and job activities. He reports he treated with massage therapy as well as chiropractic adjustments that brought temporary relief only. He was referred to orthopedics, MRI of lumbar region negative. Pt also reports testicular pain that seems to coincide with these symptoms and is currently undergoing urology work up as well. Present symptoms (on day of initial evaluation): constant pain, aching and sharp pain of right lateral hip/pelvis radiating to groin, knee and lower leg. Denies any numbness, weakness, balance disturbance or falling.    · Aggravating factors: driving, sitting, walking uneven ground, standing   · Relieving factors: rest, AM   · Pain level: 7/10 presently, 9/10 worst, 1/10 worst Past Medical History/Comorbidities:   Mr. Jocelyn Huertas  has a past medical history of ADD (attention deficit disorder) and Anxiety. Mr. Jocelyn Huertas  has a past surgical history that includes cholecystectomy and lap,cholecystectomy.   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, involving driving and heavy lifting    Recreational: Mountain biking   Dominant Side:         RIGHT  Other Clinical Tests:          MRI of lumbar spine        Urology workup  Previous Treatment Approaches:          Massage therapy, chiropractic, injections     Personal Factors:          Coping Style: anxiety  Current Medications:       Current Outpatient Prescriptions:     busPIRone (BUSPAR) 7.5 mg tablet, Take 1 Tab by mouth two (2) times a day., Disp: 60 Tab, Rfl: 5   Date Last Reviewed:  3/24/2017     EXAMINATION:   Observation/Orthostatic Postural Assessment:    ·       Left iliac crest and PSIS elevated in standing compared to right   ·       Right LE longer than left in supine position   Palpation:    · Left PSIS more superficial than right   · Tight paraspinal and QL left   · Tender with right PSIS mobilization   · Sacrum appears symmetrical   ROM:            Lumbar spine Date:  3/7/17 Date:   Date:     Direction  Parameters Parameters Parameters   Flexion  100%     Extension  100%     Rotation  R: 100%  L: 100%     Side bending  R: 100%  L: 100%     Hip IR R: Mild limited with groin pain at end range  L: WNL     Hip ER R: WNL   L: WNL     iliospoas R: normal   L: tight      ITB R: normal   L: normal              Strength:            Lower quadrant    DATE  3/7/17 DATE     Hip flexion R: 4   L: 5 R:   L:    Hip Abduction  R: 4  L: 4+ R:   L:    Hip Extension  R: 4+  L:  4+ R:   L:    Knee Flexion  R: 5  L: 5 R:   L:    Knee Extension  R: 5  L: 5 R:   L:    Ankle Dorsiflexion  R: 5  L:5 R:   L: Ankle Plantarflexion  R:5  L:5 R:  L:          Special Tests:          Marchers test (+) right --suggesting right SIJ stiff/dysfuncitonal    Neurological Screen:        Normal   Functional Mobility:         Gait/Ambulation:  Right ER hip during stance phase         Transfers:  Normal         Bed Mobility:  Normal         Stairs:  Normal    CLINICAL DECISION MAKING:   Outcome Measure: Tool Used: Modified Oswestry Low Back Pain Questionnaire  Score:  Initial: 21/50  Most Recent: X/50 (Date: -- )   Interpretation of Score: Each section is scored on a 0-5 scale, 5 representing the greatest disability. The scores of each section are added together for a total score of 50. Medical Necessity:   · Patient is expected to demonstrate progress in strength and range of motion to increase independence with recreational and work activities . Reason for Services/Other Comments:  · Patient continues to require modification of therapeutic interventions to increase complexity of exercises. TREATMENT:   (In addition to Assessment/Re-Assessment sessions the following treatments were rendered)  Pre-treatment Symptoms/Complaints:pt states the symptoms returned over last few days especially during driving activities. He states that the numbess of feet was not as frequent over last few days. Pain: Initial:     3/10 entire right hip to knee  Post Session:  2/10 at end of session      THERAPEUTIC EXERCISE: (10 minutes):  Exercises per grid below to improve mobility and strength. Required moderate visual, verbal, manual and tactile cues to promote proper body alignment, promote proper body posture and promote proper body mechanics. Progressed resistance, range and repetitions as indicated.    Date:  3/7/17 Date:  3/9/17 Date:  3/15/17 3/17/17 3/21/17 3/24/17   Activity/Exercise Parameters Parameters Parameters      shotgun 5, 10 sec holds and demo-do for home Home  HEP       iso hip flexion  Next visit  3 x 20 sec  3 x 20 sec       nustep or bike  Next visit Bike x 10 min level 2  Bike x 10 min level 3 Bike x 10 min level 3  airdyne x 10 min  Recumbent x 10 min level 3    shuttle Next visit  Next  Next       Right knee to chest /left leg off table   With manual resist to correct rotation of pelvis  See manual       Side gliding left standing   3 x 3 sec hold  ----hep---      Bridge with ball squeeze   X 5, 10 sec hold  5 x 10 sec       Foot tap over bolster (SLS balance)   Lateral x 10 B  forwd x 20 B       Side stepping with band   GTB 1 lap       Standing rotation correction     Right foot on table      Prone press up     X 10, 5 sec holds              Manual:  none performed today 2/24/17  · grade IV PA's to left PSIS   · Grade IV CPA's and left UPA to lower lumbar   · Long distraction to left LE to decrease up slip  · Deep MFR to left QL   · Right grade IV hip IR     · Kinesio tape to SIJs    Modalities;  Lumbar intermittent mechanical traction 60#/30#, 60 sec/20 sec, 12 min   Ice x 10 min lumbar and sacral     Treatment/Session Assessment:  Attempted traction today to try and increase muscle relaxation surrounding lumbar and sacral spine also to alleviate possible radicular symptoms. · Response to Treatment:  No lumbar discomfort but continued pain into right anterior thigh and knee  · Compliance with Program/Exercises: Will assess as treatment progresses. · Recommendations/Intent for next treatment session: \"Next visit will focus on advancements to more challenging activities\".     Future Appointments  Date Time Provider Vishal Rodriguez   3/27/2017 7:45 AM EDMUND Mahan   3/27/2017 9:10 AM Sky Younger MD [facility] PST PST   3/30/2017 7:45 AM EDMUND Mahan       Total Treatment Duration:  PT Patient Time In/Time Out  Time In: 0830  Time Out: Bandar Kin[address], PT

## 2017-03-28 ENCOUNTER — HOSPITAL ENCOUNTER (OUTPATIENT)
Dept: PHYSICAL THERAPY | Age: 32
Discharge: HOME | End: 2017-03-28
Payer: COMMERCIAL

## 2017-03-28 PROCEDURE — 97140 MANUAL THERAPY 1/> REGIONS: CPT

## 2017-03-28 PROCEDURE — 97110 THERAPEUTIC EXERCISES: CPT

## 2017-03-28 NOTE — PROGRESS NOTES
Zeeshan Champion  : 1985 [facility]  [address], [address], [place]  [phone]   [phone]          OUTPATIENT PHYSICAL THERAPY:Daily Note 3/28/2017    ICD-10: Treatment Diagnosis: right leg pain (M79.604)  Right hip pain (M25.551), sprain of sacroiliac joint (M46.08)  Precautions/Allergies:   Penicillins   Fall Risk Score: 1 (? 5 = High Risk)  MD Orders: evaluate and treat MEDICAL/REFERRING DIAGNOSIS:  Right Leg Pain   Right Hip Pain    DATE OF ONSET: 2016  REFERRING PHYSICIAN: Brandy Mendez MD  RETURN PHYSICIAN APPOINTMENT: not specified      INITIAL ASSESSMENT:  Mr. Aria Raman presents with funcitonal limitations due to right hip and LE pain. Through evaluation, pt demonstrates weakness of hip and stability musculature, mild leg length discrepancy and possible innominate rotation/upslip. He would highly benefit from skilled PT to address below problems and return to regular activity level. PROBLEM LIST (Impacting functional limitations):  1. Decreased Strength  2. Decreased ADL/Functional Activities  3. Increased Pain  4. Decreased Activity Tolerance  5. Decreased Flexibility/Joint Mobility  6. Decreased [place] with Home Exercise Program INTERVENTIONS PLANNED:  1. Cold  2. Heat  3. Home Exercise Program (HEP)  4. Manual Therapy  5. Neuromuscular Re-education/Strengthening  6. Range of Motion (ROM)  7. Therapeutic Exercise/Strengthening   TREATMENT PLAN:  Effective Dates: 3/7/17 TO 17. Frequency/Duration: 2 times a week for 2-4 weeks  GOALS: (Goals have been discussed and agreed upon with patient.)  SHORT-TERM FUNCTIONAL GOALS: Time Frame: 2-4 weeks   1. Pt will be independent with HEP focusing on core stability and promoting good spinal alignment. 2. Pt will report no symptoms of LE (distal of hip joint) for 1-2 weeks demonstrating centralization of symptoms.   3. Pt will report pain level does not exceed 5/10 in a 1-2 week period of time to demonstrate pain management. DISCHARGE GOALS: Time Frame: 4-6 weeks   1. Pt will improve LEFS score by at least 10 points   2. Pt will demonstrate full AROM of right hip all planes without report of pain to improve functional mobility. 3. Pt will be able to sustain regular exercise/recreational activities including aerobic exercise 3+ times per week without increased symptoms. 4. Pt will demonstrate 5/5 strength of R hip all planes. Rehabilitation Potential For Stated Goals: Good  Regarding Ilda Vazquez therapy, I certify that the treatment plan above will be carried out by a therapist or under their direction. Thank you for this referral,  Lisa Adrian, PT     Referring Physician Signature: Yarelis Rivers MD              Date                    The information in this section was collected on 3/7/17 (except where otherwise noted). HISTORY:   History of Present Injury/Illness (Reason for Referral):   pt reports that he began feeling right hip, groin, and leg pain October 2016 after a long back packing trip in which he was carrying heavy equipment and some times pulling equipment behind him. The pian continued over last few months and has limited his recreational and job activities. He reports he treated with massage therapy as well as chiropractic adjustments that brought temporary relief only. He was referred to orthopedics, MRI of lumbar region negative. Pt also reports testicular pain that seems to coincide with these symptoms and is currently undergoing urology work up as well. Present symptoms (on day of initial evaluation): constant pain, aching and sharp pain of right lateral hip/pelvis radiating to groin, knee and lower leg. Denies any numbness, weakness, balance disturbance or falling.    · Aggravating factors: driving, sitting, walking uneven ground, standing   · Relieving factors: rest, AM   · Pain level: 7/10 presently, 9/10 worst, 1/10 worst Past Medical History/Comorbidities:   Mr. Lorenzo Jarquin  has a past medical history of ADD (attention deficit disorder) and Anxiety. Mr. Lorenzo Jarquin  has a past surgical history that includes cholecystectomy and lap,cholecystectomy.   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, involving driving and heavy lifting    Recreational: Mountain biking   Dominant Side:         RIGHT  Other Clinical Tests:          MRI of lumbar spine        Urology workup  Previous Treatment Approaches:          Massage therapy, chiropractic, injections     Personal Factors:          Coping Style: anxiety  Current Medications:       Current Outpatient Prescriptions:     busPIRone (BUSPAR) 7.5 mg tablet, Take 1 Tab by mouth two (2) times a day., Disp: 60 Tab, Rfl: 5   Date Last Reviewed:  3/28/2017     EXAMINATION:   Observation/Orthostatic Postural Assessment:    ·       Left iliac crest and PSIS elevated in standing compared to right   ·       Right LE longer than left in supine position   Palpation:    · Left PSIS more superficial than right   · Tight paraspinal and QL left   · Tender with right PSIS mobilization   · Sacrum appears symmetrical   ROM:            Lumbar spine Date:  3/7/17 Date:   Date:     Direction  Parameters Parameters Parameters   Flexion  100%     Extension  100%     Rotation  R: 100%  L: 100%     Side bending  R: 100%  L: 100%     Hip IR R: Mild limited with groin pain at end range  L: WNL     Hip ER R: WNL   L: WNL     iliospoas R: normal   L: tight      ITB R: normal   L: normal              Strength:            Lower quadrant    DATE  3/7/17 DATE     Hip flexion R: 4   L: 5 R:   L:    Hip Abduction  R: 4  L: 4+ R:   L:    Hip Extension  R: 4+  L:  4+ R:   L:    Knee Flexion  R: 5  L: 5 R:   L:    Knee Extension  R: 5  L: 5 R:   L:    Ankle Dorsiflexion  R: 5  L:5 R:   L: Ankle Plantarflexion  R:5  L:5 R:  L:          Special Tests:          Marchers test (+) right --suggesting right SIJ stiff/dysfuncitonal    Neurological Screen:        Normal   Functional Mobility:         Gait/Ambulation:  Right ER hip during stance phase         Transfers:  Normal         Bed Mobility:  Normal         Stairs:  Normal    CLINICAL DECISION MAKING:   Outcome Measure: Tool Used: Modified Oswestry Low Back Pain Questionnaire  Score:  Initial: 21/50  Most Recent: X/50 (Date: -- )   Interpretation of Score: Each section is scored on a 0-5 scale, 5 representing the greatest disability. The scores of each section are added together for a total score of 50. Medical Necessity:   · Patient is expected to demonstrate progress in strength and range of motion to increase independence with recreational and work activities . Reason for Services/Other Comments:  · Patient continues to require modification of therapeutic interventions to increase complexity of exercises. TREATMENT:   (In addition to Assessment/Re-Assessment sessions the following treatments were rendered)  Pre-treatment Symptoms/Complaints: pt states that he has not change in symptoms since last session. Pain: Initial:     4/10 right side of low back, entire right hip to knee  Post Session:  0.5/10 at end of session       THERAPEUTIC EXERCISE: (25 minutes):  Exercises per grid below to improve mobility and strength. Required moderate visual, verbal, manual and tactile cues to promote proper body alignment, promote proper body posture and promote proper body mechanics. Progressed resistance, range and repetitions as indicated.    Date:  3/7/17 Date:  3/9/17 Date:  3/15/17 3/17/17 3/21/17 3/24/17 3/27/17   Activity/Exercise Parameters Parameters Parameters       shotgun 5, 10 sec holds and demo-do for home Home  HEP        iso hip flexion  Next visit  3 x 20 sec  3 x 20 sec     3 x 20 sec    nustep or bike  Next visit Bike x 10 min level 2  Bike x 10 min level 3 Bike x 10 min level 3  airdyne x 10 min  Recumbent x 10 min level 3  Recumbent 10 min level 3    shuttle Next visit  Next  Next        Right knee to chest /left leg off table   With manual resist to correct rotation of pelvis  See manual        Side gliding left standing   3 x 3 sec hold  ----hep---       Bridge with ball squeeze   X 5, 10 sec hold  5 x 10 sec        Foot tap over bolster (SLS balance)   Lateral x 10 B  forwd x 20 B        Side stepping with band   GTB 1 lap        Standing rotation correction     Right foot on table       Prone press up     X 10, 5 sec holds      Hip abd       hooklying with black band x 10, 5 sec   Manual:  25 min  · grade IV PA's to left PSIS   · Grade IV CPA's and left UPA to lower lumbar   · Long distraction to left LE to decrease up slip  · Deep MFR to left QL   · Right grade IV hip IR     · Kinesio tape to SIJs    Modalities; Ice x 10 min lumbar and sacral     Treatment/Session Assessment:   Left hip elevated standing , right LE longer supine in beginning of session   · Response to Treatment:  Decreased pain significantly after session today, symmetrical pelvis and LE length   · Compliance with Program/Exercises: Will assess as treatment progresses. · Recommendations/Intent for next treatment session: \"Next visit will focus on advancements to more challenging activities\".     Future Appointments  Date Time Provider Vishal Rodriguez   3/30/2017 7:45 AM Rosalina Deshpande, PT [facility]   4/5/2017 7:45 AM Rosalinaregina Deshpande, PT [facility]   4/7/2017 7:45 AM Rosalina Deshpande PT [facility]   4/11/2017 7:45 AM Rosalinaregina Deshpande, PT [facility]   4/13/2017 7:45 AM Rosalinaregina Deshpande, PT [facility]   10/16/2017 8:00 AM Awa Cardoso MD [facility] PST PST       Total Treatment Duration:  PT Patient Time In/Time Out  Time In: 0745  Time Out: [address] [facility], PT

## 2017-03-30 ENCOUNTER — HOSPITAL ENCOUNTER (OUTPATIENT)
Dept: PHYSICAL THERAPY | Age: 32
Discharge: HOME | End: 2017-03-30
Payer: COMMERCIAL

## 2017-03-30 PROCEDURE — 97140 MANUAL THERAPY 1/> REGIONS: CPT

## 2017-03-30 PROCEDURE — 97110 THERAPEUTIC EXERCISES: CPT

## 2017-03-30 NOTE — PROGRESS NOTES
Cal [place]  : 1985 [facility]  [address], [address], [place]  [phone]   [phone]          OUTPATIENT PHYSICAL THERAPY:Daily Note 3/30/2017    ICD-10: Treatment Diagnosis: right leg pain (M79.604)  Right hip pain (M25.551), sprain of sacroiliac joint (M46.08)  Precautions/Allergies:   Penicillins   Fall Risk Score: 1 (? 5 = High Risk)  MD Orders: evaluate and treat MEDICAL/REFERRING DIAGNOSIS:  Right Leg Pain   Right Hip Pain    DATE OF ONSET: 2016  REFERRING PHYSICIAN: Mansi Matthew MD  RETURN PHYSICIAN APPOINTMENT: not specified      INITIAL ASSESSMENT:  Mr. Mami Estevez presents with funcitonal limitations due to right hip and LE pain. Through evaluation, pt demonstrates weakness of hip and stability musculature, mild leg length discrepancy and possible innominate rotation/upslip. He would highly benefit from skilled PT to address below problems and return to regular activity level. PROBLEM LIST (Impacting functional limitations):  1. Decreased Strength  2. Decreased ADL/Functional Activities  3. Increased Pain  4. Decreased Activity Tolerance  5. Decreased Flexibility/Joint Mobility  6. Decreased [place] with Home Exercise Program INTERVENTIONS PLANNED:  1. Cold  2. Heat  3. Home Exercise Program (HEP)  4. Manual Therapy  5. Neuromuscular Re-education/Strengthening  6. Range of Motion (ROM)  7. Therapeutic Exercise/Strengthening   TREATMENT PLAN:  Effective Dates: 3/7/17 TO 17. Frequency/Duration: 2 times a week for 2-4 weeks  GOALS: (Goals have been discussed and agreed upon with patient.)  SHORT-TERM FUNCTIONAL GOALS: Time Frame: 2-4 weeks   1. Pt will be independent with HEP focusing on core stability and promoting good spinal alignment. 2. Pt will report no symptoms of LE (distal of hip joint) for 1-2 weeks demonstrating centralization of symptoms.   3. Pt will report pain level does not exceed 5/10 in a 1-2 week period of time to demonstrate pain management. DISCHARGE GOALS: Time Frame: 4-6 weeks   1. Pt will improve LEFS score by at least 10 points   2. Pt will demonstrate full AROM of right hip all planes without report of pain to improve functional mobility. 3. Pt will be able to sustain regular exercise/recreational activities including aerobic exercise 3+ times per week without increased symptoms. 4. Pt will demonstrate 5/5 strength of R hip all planes. Rehabilitation Potential For Stated Goals: Good  Regarding Lue Lasso therapy, I certify that the treatment plan above will be carried out by a therapist or under their direction. Thank you for this referral,  Sandy Rivera PT     Referring Physician Signature: Elena Palomo MD              Date                    The information in this section was collected on 3/7/17 (except where otherwise noted). HISTORY:   History of Present Injury/Illness (Reason for Referral):   pt reports that he began feeling right hip, groin, and leg pain October 2016 after a long back packing trip in which he was carrying heavy equipment and some times pulling equipment behind him. The pian continued over last few months and has limited his recreational and job activities. He reports he treated with massage therapy as well as chiropractic adjustments that brought temporary relief only. He was referred to orthopedics, MRI of lumbar region negative. Pt also reports testicular pain that seems to coincide with these symptoms and is currently undergoing urology work up as well. Present symptoms (on day of initial evaluation): constant pain, aching and sharp pain of right lateral hip/pelvis radiating to groin, knee and lower leg. Denies any numbness, weakness, balance disturbance or falling.    · Aggravating factors: driving, sitting, walking uneven ground, standing   · Relieving factors: rest, AM   · Pain level: 7/10 presently, 9/10 worst, 1/10 worst Past Medical History/Comorbidities:   Mr. Jasper Milton  has a past medical history of ADD (attention deficit disorder) and Anxiety. Mr. Jasper Milton  has a past surgical history that includes cholecystectomy and lap,cholecystectomy.   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, involving driving and heavy lifting    Recreational: Mountain biking   Dominant Side:         RIGHT  Other Clinical Tests:          MRI of lumbar spine        Urology workup  Previous Treatment Approaches:          Massage therapy, chiropractic, injections     Personal Factors:          Coping Style: anxiety  Current Medications:       Current Outpatient Prescriptions:     busPIRone (BUSPAR) 7.5 mg tablet, Take 1 Tab by mouth two (2) times a day., Disp: 60 Tab, Rfl: 5   Date Last Reviewed:  3/30/2017     EXAMINATION:   Observation/Orthostatic Postural Assessment:    ·       Left iliac crest and PSIS elevated in standing compared to right   ·       Right LE longer than left in supine position   Palpation:    · Left PSIS more superficial than right   · Tight paraspinal and QL left   · Tender with right PSIS mobilization   · Sacrum appears symmetrical   ROM:            Lumbar spine Date:  3/7/17 Date:   Date:     Direction  Parameters Parameters Parameters   Flexion  100%     Extension  100%     Rotation  R: 100%  L: 100%     Side bending  R: 100%  L: 100%     Hip IR R: Mild limited with groin pain at end range  L: WNL     Hip ER R: WNL   L: WNL     iliospoas R: normal   L: tight      ITB R: normal   L: normal              Strength:            Lower quadrant    DATE  3/7/17 DATE     Hip flexion R: 4   L: 5 R:   L:    Hip Abduction  R: 4  L: 4+ R:   L:    Hip Extension  R: 4+  L:  4+ R:   L:    Knee Flexion  R: 5  L: 5 R:   L:    Knee Extension  R: 5  L: 5 R:   L:    Ankle Dorsiflexion  R: 5  L:5 R:   L: Ankle Plantarflexion  R:5  L:5 R:  L:          Special Tests:          Marchers test (+) right --suggesting right SIJ stiff/dysfuncitonal    Neurological Screen:        Normal   Functional Mobility:         Gait/Ambulation:  Right ER hip during stance phase         Transfers:  Normal         Bed Mobility:  Normal         Stairs:  Normal    CLINICAL DECISION MAKING:   Outcome Measure: Tool Used: Modified Oswestry Low Back Pain Questionnaire  Score:  Initial: 21/50  Most Recent: X/50 (Date: -- )   Interpretation of Score: Each section is scored on a 0-5 scale, 5 representing the greatest disability. The scores of each section are added together for a total score of 50. Medical Necessity:   · Patient is expected to demonstrate progress in strength and range of motion to increase independence with recreational and work activities . Reason for Services/Other Comments:  · Patient continues to require modification of therapeutic interventions to increase complexity of exercises. TREATMENT:   (In addition to Assessment/Re-Assessment sessions the following treatments were rendered)  Pre-treatment Symptoms/Complaints: pt states that he had min to no pain yesterday throughout work tasks, mild. He states that he had mild return of pain last night but overall feels pretty good. Pain: Initial:     2/10 right side of low back, entire right hip to knee  Post Session:  0/10 at end of session       THERAPEUTIC EXERCISE: (25 minutes):  Exercises per grid below to improve mobility and strength. Required moderate visual, verbal, manual and tactile cues to promote proper body alignment, promote proper body posture and promote proper body mechanics. Progressed resistance, range and repetitions as indicated.    Date:  3/7/17 Date:  3/9/17 Date:  3/15/17 3/17/17 3/21/17 3/24/17 3/27/17 3/30/17   Activity/Exercise Parameters Parameters Parameters        shotgun 5, 10 sec holds and demo-do for home Home  HEP iso hip flexion  Next visit  3 x 20 sec  3 x 20 sec     3 x 20 sec     nustep or bike  Next visit Bike x 10 min level 2  Bike x 10 min level 3 Bike x 10 min level 3  airdyne x 10 min  Recumbent x 10 min level 3  Recumbent 10 min level 3  Recumbent 10 min    shuttle Next visit  Next  Next      Next visit    Right knee to chest /left leg off table   With manual resist to correct rotation of pelvis  See manual         Side gliding left standing   3 x 3 sec hold  ----hep---        Bridge with ball squeeze   X 5, 10 sec hold  5 x 10 sec         Foot tap over bolster (SLS balance)   Lateral x 10 B  forwd x 20 B         Side stepping with band   GTB 1 lap         Standing rotation correction     Right foot on table        Prone press up     X 10, 5 sec holds       Hip abd       hooklying with black band x 10, 5 sec    Balance activities         Next visit. SL and DL, BF, bosu    Manual:  25 min  · grade IV PA's to left PSIS   · Grade IV CPA's and left UPA to lower lumbar   · Long distraction to left LE to decrease up slip  · Deep MFR to left QL   · Right grade IV hip IR     · Kinesio tape to SIJs    Modalities; Ice x 10 min lumbar and sacral     Treatment/Session Assessment:   Left hip mildly elevated standing, right LE longer supine in beginning of session   · Response to Treatment:  No pain upon departure   · Compliance with Program/Exercises: Will assess as treatment progresses. · Recommendations/Intent for next treatment session: \"Next visit will focus on advancements to more challenging activities\".     Future Appointments  Date Time Provider Vishal Rodriguez   [facility]2017 7:[facility]   4/7/2017 7:45 AM [facility]   4/11/2017 7:45 AM Lisa Adrian PT [facility]   4/13/2017 7:45 AM Lisa Adrian PT [facility]   10/16/2017 8:00 AM Rashi Guo MD [facility] PST PST       Total Treatment Duration:  PT Patient Time In/Time Out  Time In: 0745  Time Out: [address] [place], PT

## 2017-04-05 ENCOUNTER — HOSPITAL ENCOUNTER (OUTPATIENT)
Dept: PHYSICAL THERAPY | Age: 32
Discharge: HOME | End: 2017-04-05
Payer: COMMERCIAL

## 2017-04-05 PROCEDURE — 97110 THERAPEUTIC EXERCISES: CPT

## 2017-04-05 PROCEDURE — 97140 MANUAL THERAPY 1/> REGIONS: CPT

## 2017-04-05 NOTE — PROGRESS NOTES
Tamia Corado  : 1985 [facility]  [address], [address], [place]  [phone]   [phone]          OUTPATIENT PHYSICAL THERAPY:Daily Note 2017    ICD-10: Treatment Diagnosis: right leg pain (M79.604)  Right hip pain (M25.551), sprain of sacroiliac joint (M46.08)  Precautions/Allergies:   Penicillins   Fall Risk Score: 1 (? 5 = High Risk)  MD Orders: evaluate and treat MEDICAL/REFERRING DIAGNOSIS:  Right Leg Pain   Right Hip Pain    DATE OF ONSET: 2016  REFERRING PHYSICIAN: Curt Awan MD  RETURN PHYSICIAN APPOINTMENT: not specified      INITIAL ASSESSMENT:  Mr. Rodger Garcia presents with funcitonal limitations due to right hip and LE pain. Through evaluation, pt demonstrates weakness of hip and stability musculature, mild leg length discrepancy and possible innominate rotation/upslip. He would highly benefit from skilled PT to address below problems and return to regular activity level. PROBLEM LIST (Impacting functional limitations):  1. Decreased Strength  2. Decreased ADL/Functional Activities  3. Increased Pain  4. Decreased Activity Tolerance  5. Decreased Flexibility/Joint Mobility  6. Decreased [place] with Home Exercise Program INTERVENTIONS PLANNED:  1. Cold  2. Heat  3. Home Exercise Program (HEP)  4. Manual Therapy  5. Neuromuscular Re-education/Strengthening  6. Range of Motion (ROM)  7. Therapeutic Exercise/Strengthening   TREATMENT PLAN:  Effective Dates: 3/7/17 TO 17. Frequency/Duration: 2 times a week for 2-4 weeks  GOALS: (Goals have been discussed and agreed upon with patient.)  SHORT-TERM FUNCTIONAL GOALS: Time Frame: 2-4 weeks   1. Pt will be independent with HEP focusing on core stability and promoting good spinal alignment. 2. Pt will report no symptoms of LE (distal of hip joint) for 1-2 weeks demonstrating centralization of symptoms.   3. Pt will report pain level does not exceed 5/10 in a 1-2 week period of time to demonstrate pain management. DISCHARGE GOALS: Time Frame: 4-6 weeks   1. Pt will improve LEFS score by at least 10 points   2. Pt will demonstrate full AROM of right hip all planes without report of pain to improve functional mobility. 3. Pt will be able to sustain regular exercise/recreational activities including aerobic exercise 3+ times per week without increased symptoms. 4. Pt will demonstrate 5/5 strength of R hip all planes. Rehabilitation Potential For Stated Goals: Good  Regarding Chrystie Backers therapy, I certify that the treatment plan above will be carried out by a therapist or under their direction. Thank you for this referral,  Rodger Sherman PT     Referring Physician Signature: Manfred Paget, MD              Date                    The information in this section was collected on 3/7/17 (except where otherwise noted). HISTORY:   History of Present Injury/Illness (Reason for Referral):   pt reports that he began feeling right hip, groin, and leg pain October 2016 after a long back packing trip in which he was carrying heavy equipment and some times pulling equipment behind him. The pian continued over last few months and has limited his recreational and job activities. He reports he treated with massage therapy as well as chiropractic adjustments that brought temporary relief only. He was referred to orthopedics, MRI of lumbar region negative. Pt also reports testicular pain that seems to coincide with these symptoms and is currently undergoing urology work up as well. Present symptoms (on day of initial evaluation): constant pain, aching and sharp pain of right lateral hip/pelvis radiating to groin, knee and lower leg. Denies any numbness, weakness, balance disturbance or falling.    · Aggravating factors: driving, sitting, walking uneven ground, standing   · Relieving factors: rest, AM   · Pain level: 7/10 presently, 9/10 worst, 1/10 worst Past Medical History/Comorbidities:   Mr. Ramos Sung  has a past medical history of ADD (attention deficit disorder) and Anxiety. Mr. Ramos Sung  has a past surgical history that includes cholecystectomy and lap,cholecystectomy.   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, involving driving and heavy lifting    Recreational: Mountain biking   Dominant Side:         RIGHT  Other Clinical Tests:          MRI of lumbar spine        Urology workup  Previous Treatment Approaches:          Massage therapy, chiropractic, injections     Personal Factors:          Coping Style: anxiety  Current Medications:       Current Outpatient Prescriptions:     busPIRone (BUSPAR) 7.5 mg tablet, Take 1 Tab by mouth two (2) times a day., Disp: 60 Tab, Rfl: 5   Date Last Reviewed:  4/5/2017     EXAMINATION:   Observation/Orthostatic Postural Assessment:    ·       Left iliac crest and PSIS elevated in standing compared to right   ·       Right LE longer than left in supine position   Palpation:    · Left PSIS more superficial than right   · Tight paraspinal and QL left   · Tender with right PSIS mobilization   · Sacrum appears symmetrical   ROM:            Lumbar spine Date:  3/7/17 Date:   Date:     Direction  Parameters Parameters Parameters   Flexion  100%     Extension  100%     Rotation  R: 100%  L: 100%     Side bending  R: 100%  L: 100%     Hip IR R: Mild limited with groin pain at end range  L: WNL     Hip ER R: WNL   L: WNL     iliospoas R: normal   L: tight      ITB R: normal   L: normal              Strength:            Lower quadrant    DATE  3/7/17 DATE     Hip flexion R: 4   L: 5 R:   L:    Hip Abduction  R: 4  L: 4+ R:   L:    Hip Extension  R: 4+  L:  4+ R:   L:    Knee Flexion  R: 5  L: 5 R:   L:    Knee Extension  R: 5  L: 5 R:   L:    Ankle Dorsiflexion  R: 5  L:5 R:   L: Ankle Plantarflexion  R:5  L:5 R:  L:          Special Tests:          Marchers test (+) right --suggesting right SIJ stiff/dysfuncitonal    Neurological Screen:        Normal   Functional Mobility:         Gait/Ambulation:  Right ER hip during stance phase         Transfers:  Normal         Bed Mobility:  Normal         Stairs:  Normal    CLINICAL DECISION MAKING:   Outcome Measure: Tool Used: Modified Oswestry Low Back Pain Questionnaire  Score:  Initial: 21/50  Most Recent: X/50 (Date: -- )   Interpretation of Score: Each section is scored on a 0-5 scale, 5 representing the greatest disability. The scores of each section are added together for a total score of 50. Medical Necessity:   · Patient is expected to demonstrate progress in strength and range of motion to increase independence with recreational and work activities . Reason for Services/Other Comments:  · Patient continues to require modification of therapeutic interventions to increase complexity of exercises. TREATMENT:   (In addition to Assessment/Re-Assessment sessions the following treatments were rendered)  Pre-treatment Symptoms/Complaints: pt states that he did very well with 3 days of relief after last treatment and then return of pain yesterday. He states that he sees improvement with treatments. Pain: Initial:     2/10 right side of low back, mild into knee  Post Session:  0/10 at end of session       THERAPEUTIC EXERCISE: (30 minutes):  Exercises per grid below to improve mobility and strength. Required moderate visual, verbal, manual and tactile cues to promote proper body alignment, promote proper body posture and promote proper body mechanics. Progressed resistance, range and repetitions as indicated.    Date:  3/9/17 Date:  3/15/17 3/17/17 3/21/17 3/24/17 3/27/17 3/30/17 4/5/17   Activity/Exercise Parameters Parameters         shotgun Home  HEP          iso hip flexion  3 x 20 sec  3 x 20 sec     3 x 20 sec nustep or bike  Bike x 10 min level 2  Bike x 10 min level 3 Bike x 10 min level 3  airdyne x 10 min  Recumbent x 10 min level 3  Recumbent 10 min level 3  Recumbent 10 min  Recumbent bike 10 min  (level 3 hill)   shuttle Next  Next      Next visit  100# x 10 DL   Right knee to chest /left leg off table  With manual resist to correct rotation of pelvis  See manual          Side gliding left standing  3 x 3 sec hold  ----hep---         Bridge with ball squeeze  X 5, 10 sec hold  5 x 10 sec          Foot tap over bolster (SLS balance)  Lateral x 10 B  forwd x 20 B       X 10 B lat and fwb bkwd   Side stepping with band  GTB 1 lap          Standing rotation correction    Right foot on table         Prone press up    X 10, 5 sec holds     X 10    Hip abd      hooklying with black band x 10, 5 sec     Balance activities        Next visit. SL and DL, BF, bosu  Bolster foot tapping  x 10 B and in each direction    Marching         2 laps    Balance board         Next session    Manual:  10 min  · grade IV PA's to left PSIS   · Grade IV CPA's and left UPA to lower lumbar   · Long distraction to left LE to decrease up slip  · Deep MFR to left QL   · Right grade IV hip IR     · Kinesio tape to SIJs    Modalities; None today      Treatment/Session Assessment:   Mild tightness of left QL noted today. Discussed activities at gym that would be good for pt to attempt on his own. · Response to Treatment:  No pain upon departure   · Compliance with Program/Exercises: Will assess as treatment progresses.   · Recommendations/Intent for next treatment session: continue to work on core and hip strengthening     Future Appointments  Date Time Provider Vishal Rodriguez   4/7/2017 7:45 AM EDMUND Ybarra [facility]   4/11/2017 7:45 AM EDMUND Ybarra [facility]   4/13/2017 7:45 AM EDMUND Ybarra [facility]   10/16/2017 8:00 AM MD ABEBA Deleon PST PST       Total Treatment Duration:  PT Patient Time In/Time Out  Time In: 0745  Time Out: Shalini 45, PT

## 2017-04-07 ENCOUNTER — HOSPITAL ENCOUNTER (OUTPATIENT)
Dept: PHYSICAL THERAPY | Age: 32
Discharge: HOME | End: 2017-04-07
Payer: COMMERCIAL

## 2017-04-07 PROCEDURE — 97110 THERAPEUTIC EXERCISES: CPT

## 2017-04-07 PROCEDURE — 97140 MANUAL THERAPY 1/> REGIONS: CPT

## 2017-04-07 NOTE — PROGRESS NOTES
Cal McRae Helena  : 1985 [facility]  [address], [address], [place] [address]  [phone]   [phone]          OUTPATIENT PHYSICAL THERAPY:Daily Note 2017    ICD-10: Treatment Diagnosis: right leg pain (M79.604)  Right hip pain (M25.551), sprain of sacroiliac joint (M46.08)  Precautions/Allergies:   Penicillins   Fall Risk Score: 1 (? 5 = High Risk)  MD Orders: evaluate and treat MEDICAL/REFERRING DIAGNOSIS:  Right Leg Pain   Right Hip Pain    DATE OF ONSET: 2016  REFERRING PHYSICIAN: Mansi Matthew MD  RETURN PHYSICIAN APPOINTMENT: not specified      INITIAL ASSESSMENT:  Mr. Mami Estevez presents with funcitonal limitations due to right hip and LE pain. Through evaluation, pt demonstrates weakness of hip and stability musculature, mild leg length discrepancy and possible innominate rotation/upslip. He would highly benefit from skilled PT to address below problems and return to regular activity level. PROBLEM LIST (Impacting functional limitations):  1. Decreased Strength  2. Decreased ADL/Functional Activities  3. Increased Pain  4. Decreased Activity Tolerance  5. Decreased Flexibility/Joint Mobility  6. Decreased [place] with Home Exercise Program INTERVENTIONS PLANNED:  1. Cold  2. Heat  3. Home Exercise Program (HEP)  4. Manual Therapy  5. Neuromuscular Re-education/Strengthening  6. Range of Motion (ROM)  7. Therapeutic Exercise/Strengthening   TREATMENT PLAN:  Effective Dates: 3/7/17 TO 17. Frequency/Duration: 2 times a week for 2-4 weeks  GOALS: (Goals have been discussed and agreed upon with patient.)  SHORT-TERM FUNCTIONAL GOALS: Time Frame: 2-4 weeks   1. Pt will be independent with HEP focusing on core stability and promoting good spinal alignment. 2. Pt will report no symptoms of LE (distal of hip joint) for 1-2 weeks demonstrating centralization of symptoms.   3. Pt will report pain level does not exceed 5/10 in a 1-2 week period of time to demonstrate pain management. DISCHARGE GOALS: Time Frame: 4-6 weeks   1. Pt will improve LEFS score by at least 10 points   2. Pt will demonstrate full AROM of right hip all planes without report of pain to improve functional mobility. 3. Pt will be able to sustain regular exercise/recreational activities including aerobic exercise 3+ times per week without increased symptoms. 4. Pt will demonstrate 5/5 strength of R hip all planes. Rehabilitation Potential For Stated Goals: Good  Regarding Romario Philip therapy, I certify that the treatment plan above will be carried out by a therapist or under their direction. Thank you for this referral,  Lul Redman PT     Referring Physician Signature: Aly Reyna MD              Date                    The information in this section was collected on 3/7/17 (except where otherwise noted). HISTORY:   History of Present Injury/Illness (Reason for Referral):   pt reports that he began feeling right hip, groin, and leg pain October 2016 after a long back packing trip in which he was carrying heavy equipment and some times pulling equipment behind him. The pian continued over last few months and has limited his recreational and job activities. He reports he treated with massage therapy as well as chiropractic adjustments that brought temporary relief only. He was referred to orthopedics, MRI of lumbar region negative. Pt also reports testicular pain that seems to coincide with these symptoms and is currently undergoing urology work up as well. Present symptoms (on day of initial evaluation): constant pain, aching and sharp pain of right lateral hip/pelvis radiating to groin, knee and lower leg. Denies any numbness, weakness, balance disturbance or falling.    · Aggravating factors: driving, sitting, walking uneven ground, standing   · Relieving factors: rest, AM   · Pain level: 7/10 presently, 9/10 worst, 1/10 worst Past Medical History/Comorbidities:   Mr. Duke Thomas  has a past medical history of ADD (attention deficit disorder) and Anxiety. Mr. Duke Thomas  has a past surgical history that includes cholecystectomy and lap,cholecystectomy.   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, involving driving and heavy lifting    Recreational: Mountain biking   Dominant Side:         RIGHT  Other Clinical Tests:          MRI of lumbar spine        Urology workup  Previous Treatment Approaches:          Massage therapy, chiropractic, injections     Personal Factors:          Coping Style: anxiety  Current Medications:       Current Outpatient Prescriptions:     busPIRone (BUSPAR) 7.5 mg tablet, Take 1 Tab by mouth two (2) times a day., Disp: 60 Tab, Rfl: 5   Date Last Reviewed:  4/7/2017     EXAMINATION:   Observation/Orthostatic Postural Assessment:    ·       Left iliac crest and PSIS elevated in standing compared to right   ·       Right LE longer than left in supine position   Palpation:    · Left PSIS more superficial than right   · Tight paraspinal and QL left   · Tender with right PSIS mobilization   · Sacrum appears symmetrical   ROM:            Lumbar spine Date:  3/7/17 Date:   Date:     Direction  Parameters Parameters Parameters   Flexion  100%     Extension  100%     Rotation  R: 100%  L: 100%     Side bending  R: 100%  L: 100%     Hip IR R: Mild limited with groin pain at end range  L: WNL     Hip ER R: WNL   L: WNL     iliospoas R: normal   L: tight      ITB R: normal   L: normal              Strength:            Lower quadrant    DATE  3/7/17 DATE     Hip flexion R: 4   L: 5 R:   L:    Hip Abduction  R: 4  L: 4+ R:   L:    Hip Extension  R: 4+  L:  4+ R:   L:    Knee Flexion  R: 5  L: 5 R:   L:    Knee Extension  R: 5  L: 5 R:   L:    Ankle Dorsiflexion  R: 5  L:5 R:   L: Ankle Plantarflexion  R:5  L:5 R:  L:          Special Tests:          Marchers test (+) right --suggesting right SIJ stiff/dysfuncitonal    Neurological Screen:        Normal   Functional Mobility:         Gait/Ambulation:  Right ER hip during stance phase         Transfers:  Normal         Bed Mobility:  Normal         Stairs:  Normal    CLINICAL DECISION MAKING:   Outcome Measure: Tool Used: Modified Oswestry Low Back Pain Questionnaire  Score:  Initial: 21/50  Most Recent: X/50 (Date: -- )   Interpretation of Score: Each section is scored on a 0-5 scale, 5 representing the greatest disability. The scores of each section are added together for a total score of 50. Medical Necessity:   · Patient is expected to demonstrate progress in strength and range of motion to increase independence with recreational and work activities . Reason for Services/Other Comments:  · Patient continues to require modification of therapeutic interventions to increase complexity of exercises. TREATMENT:   (In addition to Assessment/Re-Assessment sessions the following treatments were rendered)  Pre-treatment Symptoms/Complaints: pt states that he is more painful today because he was driving for 12 hours yesterday. Pain: Initial:     3-4/10 right side of low back, mild into knee  Post Session:  1-2/10 at end of session       THERAPEUTIC EXERCISE: (30 minutes):  Exercises per grid below to improve mobility and strength. Required moderate visual, verbal, manual and tactile cues to promote proper body alignment, promote proper body posture and promote proper body mechanics. Progressed resistance, range and repetitions as indicated.    Date:  3/15/17 3/17/17 3/21/17 3/24/17 3/27/17 3/30/17 4/5/17 4/7/17   Activity/Exercise Parameters          shotgun HEP           iso hip flexion  3 x 20 sec     3 x 20 sec       nustep or bike  Bike x 10 min level 3 Bike x 10 min level 3  airdyne x 10 min  Recumbent x 10 min level 3  Recumbent 10 min level 3  Recumbent 10 min  Recumbent bike 10 min  (level 3 hill) Recumbent 10 min level 3    shuttle Next      Next visit  100# x 10 DL    Right knee to chest /left leg off table  See manual           Side gliding left standing  ----hep---          Bridge with ball squeeze  5 x 10 sec           Foot tap over bolster (SLS balance) Lateral x 10 B  forwd x 20 B       X 10 B lat and fwb bkwd X 10 B lateral    Side stepping with band GTB 1 lap           Standing rotation correction   Right foot on table          Prone press up   X 10, 5 sec holds     X 10     Hip abd     hooklying with black band x 10, 5 sec   X 10    Balance activities       Next visit. SL and DL, BF, bosu  Bolster foot tapping  x 10 B and in each direction  Balance board   Marching        2 laps  2 laps   Balance board        Next session  Lateral and A/P x 10min    Manual:  10 min  · grade IV PA's to left PSIS   · Grade IV CPA's and left UPA to lower lumbar   · Long distraction to left LE to decrease up slip  · Deep MFR to left QL   · Right grade IV hip IR     · Kinesio tape to SIJs    Modalities; None today      Treatment/Session Assessment:   Improvement noted with standing balance activities. Needs cuing with most activities to equal weightbear. Discussed taking a spin class to practice form to prepare for mountain biking   · Response to Treatment: decreased pain upon departure   · Compliance with Program/Exercises: Will assess as treatment progresses.   · Recommendations/Intent for next treatment session: continue to work on core and hip strengthening     Future Appointments  Date Time Provider Vishal Jennifer   4/11/2017 7:45 AM Dontrell Holloway PT [facility]   4/13/2017 7:45 AM EDMUND [facility]   10/16/2017 8:00 AM MD ABEBA Santos PST PST       Total Treatment Duration:  PT Patient Time In/Time Out  Time In: 0745  Time Out: Emelia 38, PT

## 2017-04-11 ENCOUNTER — HOSPITAL ENCOUNTER (OUTPATIENT)
Dept: PHYSICAL THERAPY | Age: 32
Discharge: HOME | End: 2017-04-11
Payer: COMMERCIAL

## 2017-04-11 PROCEDURE — 97110 THERAPEUTIC EXERCISES: CPT

## 2017-04-11 PROCEDURE — 97140 MANUAL THERAPY 1/> REGIONS: CPT

## 2017-04-11 NOTE — PROGRESS NOTES
Quang Serrano  : 1985 [facility]  [address], [address], [place]  [phone]   [phone]          OUTPATIENT PHYSICAL THERAPY:Daily Note 2017    ICD-10: Treatment Diagnosis: right leg pain (M79.604)  Right hip pain (M25.551), sprain of sacroiliac joint (M46.08)  Precautions/Allergies:   Penicillins   Fall Risk Score: 1 (? 5 = High Risk)  MD Orders: evaluate and treat MEDICAL/REFERRING DIAGNOSIS:  Right Leg Pain   Right Hip Pain    DATE OF ONSET: 2016  REFERRING PHYSICIAN: Clementina Jeronimo MD  RETURN PHYSICIAN APPOINTMENT: not specified      INITIAL ASSESSMENT:  Mr. Delores Rodrigues presents with funcitonal limitations due to right hip and LE pain. Through evaluation, pt demonstrates weakness of hip and stability musculature, mild leg length discrepancy and possible innominate rotation/upslip. He would highly benefit from skilled PT to address below problems and return to regular activity level. PROBLEM LIST (Impacting functional limitations):  1. Decreased Strength  2. Decreased ADL/Functional Activities  3. Increased Pain  4. Decreased Activity Tolerance  5. Decreased Flexibility/Joint Mobility  6. Decreased [place] with Home Exercise Program INTERVENTIONS PLANNED:  1. Cold  2. Heat  3. Home Exercise Program (HEP)  4. Manual Therapy  5. Neuromuscular Re-education/Strengthening  6. Range of Motion (ROM)  7. Therapeutic Exercise/Strengthening   TREATMENT PLAN:  Effective Dates: 3/7/17 TO 17. Frequency/Duration: 2 times a week for 2-4 weeks  GOALS: (Goals have been discussed and agreed upon with patient.)  SHORT-TERM FUNCTIONAL GOALS: Time Frame: 2-4 weeks   1. Pt will be independent with HEP focusing on core stability and promoting good spinal alignment. 2. Pt will report no symptoms of LE (distal of hip joint) for 1-2 weeks demonstrating centralization of symptoms.   3. Pt will report pain level does not exceed 5/10 in a 1-2 week period of time to demonstrate pain management. DISCHARGE GOALS: Time Frame: 4-6 weeks   1. Pt will improve LEFS score by at least 10 points   2. Pt will demonstrate full AROM of right hip all planes without report of pain to improve functional mobility. 3. Pt will be able to sustain regular exercise/recreational activities including aerobic exercise 3+ times per week without increased symptoms. 4. Pt will demonstrate 5/5 strength of R hip all planes. Rehabilitation Potential For Stated Goals: Good  Regarding [employer] therapy, I certify that the treatment plan above will be carried out by a therapist or under their direction. Thank you for this referral,  Jenae Henderson PT     Referring Physician Signature: Jerome Giraldo MD              Date                    The information in this section was collected on 3/7/17 (except where otherwise noted). HISTORY:   History of Present Injury/Illness (Reason for Referral):   pt reports that he began feeling right hip, groin, and leg pain October 2016 after a long back packing trip in which he was carrying heavy equipment and some times pulling equipment behind him. The pian continued over last few months and has limited his recreational and job activities. He reports he treated with massage therapy as well as chiropractic adjustments that brought temporary relief only. He was referred to orthopedics, MRI of lumbar region negative. Pt also reports testicular pain that seems to coincide with these symptoms and is currently undergoing urology work up as well. Present symptoms (on day of initial evaluation): constant pain, aching and sharp pain of right lateral hip/pelvis radiating to groin, knee and lower leg. Denies any numbness, weakness, balance disturbance or falling.    · Aggravating factors: driving, sitting, walking uneven ground, standing   · Relieving factors: rest, AM   · Pain level: 7/10 presently, 9/10 worst, 1/10 worst Past Medical History/Comorbidities:   Mr. Gabriel Triana  has a past medical history of ADD (attention deficit disorder) and Anxiety. Mr. Gabriel Triana  has a past surgical history that includes cholecystectomy and lap,cholecystectomy.   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, involving driving and heavy lifting    Recreational: Mountain biking   Dominant Side:         RIGHT  Other Clinical Tests:          MRI of lumbar spine        Urology workup  Previous Treatment Approaches:          Massage therapy, chiropractic, injections     Personal Factors:          Coping Style: anxiety  Current Medications:       Current Outpatient Prescriptions:     busPIRone (BUSPAR) 7.5 mg tablet, Take 1 Tab by mouth two (2) times a day., Disp: 60 Tab, Rfl: 5   Date Last Reviewed:  4/11/2017     EXAMINATION:   Observation/Orthostatic Postural Assessment:    ·       Left iliac crest and PSIS elevated in standing compared to right   ·       Right LE longer than left in supine position   Palpation:    · Left PSIS more superficial than right   · Tight paraspinal and QL left   · Tender with right PSIS mobilization   · Sacrum appears symmetrical   ROM:            Lumbar spine Date:  3/7/17 Date:   Date:     Direction  Parameters Parameters Parameters   Flexion  100%     Extension  100%     Rotation  R: 100%  L: 100%     Side bending  R: 100%  L: 100%     Hip IR R: Mild limited with groin pain at end range  L: WNL     Hip ER R: WNL   L: WNL     iliospoas R: normal   L: tight      ITB R: normal   L: normal              Strength:            Lower quadrant    DATE  3/7/17 DATE     Hip flexion R: 4   L: 5 R:   L:    Hip Abduction  R: 4  L: 4+ R:   L:    Hip Extension  R: 4+  L:  4+ R:   L:    Knee Flexion  R: 5  L: 5 R:   L:    Knee Extension  R: 5  L: 5 R:   L:    Ankle Dorsiflexion  R: 5  L:5 R:   L: Ankle Plantarflexion  R:5  L:5 R:  L:          Special Tests:          Marchers test (+) right --suggesting right SIJ stiff/dysfuncitonal    Neurological Screen:        Normal   Functional Mobility:         Gait/Ambulation:  Right ER hip during stance phase         Transfers:  Normal         Bed Mobility:  Normal         Stairs:  Normal    CLINICAL DECISION MAKING:   Outcome Measure: Tool Used: Modified Oswestry Low Back Pain Questionnaire  Score:  Initial: 21/50  Most Recent: X/50 (Date: -- )   Interpretation of Score: Each section is scored on a 0-5 scale, 5 representing the greatest disability. The scores of each section are added together for a total score of 50. Medical Necessity:   · Patient is expected to demonstrate progress in strength and range of motion to increase independence with recreational and work activities . Reason for Services/Other Comments:  · Patient continues to require modification of therapeutic interventions to increase complexity of exercises. TREATMENT:   (In addition to Assessment/Re-Assessment sessions the following treatments were rendered)  Pre-treatment Symptoms/Complaints: much better, overall less intense and more localized pain over last week. Pain: Initial:     2/10 right side of low back Post Session:    0/10     THERAPEUTIC EXERCISE: (40 minutes):  Exercises per grid below to improve mobility and strength. Required moderate visual, verbal, manual and tactile cues to promote proper body alignment, promote proper body posture and promote proper body mechanics. Progressed resistance, range and repetitions as indicated.    3/17/17 3/21/17 3/24/17 3/27/17 3/30/17 4/5/17 4/7/17 4/11/17   Activity/Exercise           shotgun           iso hip flexion     3 x 20 sec        nustep or bike  Bike x 10 min level 3  airdyne x 10 min  Recumbent x 10 min level 3  Recumbent 10 min level 3  Recumbent 10 min  Recumbent bike 10 min  (level 3 hill) Recumbent 10 min level 3  Recumbent 10 min level 3    shuttle     Next visit  100# x 10 DL  125# DL 3 x 10   75# SL x 10 B   Right knee to chest /left leg off table            Side gliding left standing            Bridge with ball squeeze            Foot tap over bolster (SLS balance)      X 10 B lat and fwb bkwd X 10 B lateral     Side stepping with band        GTB x 2 laps    Standing rotation correction  Right foot on table           Prone press up  X 10, 5 sec holds     X 10   X 5    Hip abd    hooklying with black band x 10, 5 sec   X 10     Balance activities      Next visit. SL and DL, BF, bosu  Bolster foot tapping  x 10 B and in each direction  Balance board BF toe tapping   Marching       2 laps  2 laps    Balance board       Next session  Lateral and A/P x 10min  Lateral and AP x 2 min each    Manual:  10 min  · grade IV PA's to left PSIS   · Grade IV CPA's and left UPA to lower lumbar   · Long distraction to left LE to decrease up slip  · Deep MFR to left QL   · Right grade IV hip IR     · Kinesio tape to SIJs    Modalities; None today      Treatment/Session Assessment:   Improvement noted with standing balance activities. · Response to Treatment: decreased pain upon departure   · Compliance with Program/Exercises: Will assess as treatment progresses.   · Recommendations/Intent for next treatment session: continue to work on core and hip strengthening     Future Appointments  Date Time Provider Vishal Rodriguez   4/13/2017 7:45 AM Perla Perez PT [facility]   10/16/2017 8:00 AM Hattie Flores MD [facility] PST PST       Total Treatment Duration:  PT Patient Time In/Time Out  Time In: 0745  Time Out: [address] [place], PT

## 2017-04-13 ENCOUNTER — HOSPITAL ENCOUNTER (OUTPATIENT)
Dept: PHYSICAL THERAPY | Age: 32
Discharge: HOME | End: 2017-04-13
Payer: COMMERCIAL

## 2017-04-13 PROCEDURE — 97110 THERAPEUTIC EXERCISES: CPT

## 2017-04-13 PROCEDURE — 97140 MANUAL THERAPY 1/> REGIONS: CPT

## 2017-04-13 NOTE — PROGRESS NOTES
Belem Morelos  : 1985 [facility] at [facility]  [address], [address], [place]  [phone]   [phone]          OUTPATIENT PHYSICAL THERAPY:Daily Note and Discharge 2017    ICD-10: Treatment Diagnosis: right leg pain (M79.604)  Right hip pain (M25.551), sprain of sacroiliac joint (M46.08)  Precautions/Allergies:   Penicillins   Fall Risk Score: 1 (? 5 = High Risk)  MD Orders: evaluate and treat MEDICAL/REFERRING DIAGNOSIS:  Right Leg Pain   Right Hip Pain    DATE OF ONSET: 2016  REFERRING PHYSICIAN: Erik Escobar MD  RETURN PHYSICIAN APPOINTMENT: not specified      INITIAL ASSESSMENT:  Mr. Ivette Victor has attended 12 scheduled PT visits addressing right low back and hip pain. Overall, pt has made great progress and has met all therapy goals. He continues to have some intermittent localized right hip symptoms at the end of a 10 hour work day however, is able to manage symptoms with his exercises. He is ready for discharge and has voiced a plan to continue his therapeutic exercises on his own as well as progressing gym and recreational activities gradually, independently. TREATMENT PLAN:  GOALS: (Goals have been discussed and agreed upon with patient.)  SHORT-TERM FUNCTIONAL GOALS:    1. Pt will be independent with HEP focusing on core stability and promoting good spinal alignment. (met)  2. Pt will report no symptoms of LE (distal of hip joint) for 1-2 weeks demonstrating centralization of symptoms. (met)  3. Pt will report pain level does not exceed 5/10 in a 1-2 week period of time to demonstrate pain management. (met)    DISCHARGE GOALS:   1. Pt will improve LEFS score by at least 10 points  (met)  2. Pt will demonstrate full AROM of right hip all planes without report of pain to improve functional mobility. (met)  3.  Pt will be able to sustain regular exercise/recreational activities including aerobic exercise 3+ times per week without increased symptoms. (met)  4. Pt will demonstrate 5/5 strength of R hip all planes. (met)     Thank you for this referral,  Elner Felty, PT                 The information in this section was collected on 3/7/17 (except where otherwise noted). HISTORY:   History of Present Injury/Illness (Reason for Referral):   pt reports that he began feeling right hip, groin, and leg pain October 2016 after a long back packing trip in which he was carrying heavy equipment and some times pulling equipment behind him. The pian continued over last few months and has limited his recreational and job activities. He reports he treated with massage therapy as well as chiropractic adjustments that brought temporary relief only. He was referred to orthopedics, MRI of lumbar region negative. Pt also reports testicular pain that seems to coincide with these symptoms and is currently undergoing urology work up as well. Present symptoms (on day of initial evaluation): constant pain, aching and sharp pain of right lateral hip/pelvis radiating to groin, knee and lower leg. Denies any numbness, weakness, balance disturbance or falling. · Aggravating factors: driving, sitting, walking uneven ground, standing   · Relieving factors: rest, AM   · Pain level: 7/10 presently, 9/10 worst, 1/10 worst     Past Medical History/Comorbidities:   Mr. Shawnee Woodward  has a past medical history of ADD (attention deficit disorder) and Anxiety. Mr. Shawnee Woodward  has a past surgical history that includes cholecystectomy and lap,cholecystectomy.   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, involving driving and heavy lifting    Recreational: Mountain biking   Dominant Side:         RIGHT  Other Clinical Tests:          MRI of lumbar spine        Urology workup  Previous Treatment Approaches: Massage therapy, chiropractic, injections     Personal Factors:          Coping Style: anxiety  Current Medications:       Current Outpatient Prescriptions:     busPIRone (BUSPAR) 7.5 mg tablet, Take 1 Tab by mouth two (2) times a day., Disp: 60 Tab, Rfl: 5   Date Last Reviewed:  4/13/2017     EXAMINATION: 4/13/17   Observation/Orthostatic Postural Assessment:    ·       Left iliac crest and PSIS slightly elevated in standing compared to right   ·       Right LE longer than left in supine position   Palpation:     · Tight paraspinal and QL left   · Sacrum appears symmetrical   ROM:            Lumbar spine Date:  3/7/17 Date:  4/12/17 Date:     Direction  Parameters Parameters Parameters   Flexion  100% 100%    Extension  100% 100%    Rotation  R: 100%  L: 100% 100% B    Side bending  R: 100%  L: 100% 100% B    Hip IR R: Mild limited with groin pain at end range  L: WNL WNL B no pain     Hip ER R: WNL   L: WNL WNL B     iliospoas R: normal   L: tight  WNL B    ITB R: normal   L: normal  WNL B      Strength:            Lower quadrant    DATE  3/7/17 DATE  4/12/17   Hip flexion R: 4   L: 5 5 B   Hip Abduction  R: 4  L: 4+ 5 B   Hip Extension  R: 4+  L:  4+ 5 B    Knee Flexion  R: 5  L: 5 5 B   Knee Extension  R: 5  L: 5 5 B   Ankle Dorsiflexion  R: 5  L:5 5 B   Ankle Plantarflexion  R:5  L:5 5 B     Special Tests:          Marchers test (+) right --suggesting right SIJ stiff/dysfuncitonal    Neurological Screen:        Normal   Functional Mobility:         Gait/Ambulation:  Right ER hip during stance phase         Transfers:  Normal         Bed Mobility:  Normal         Stairs:  Normal    CLINICAL DECISION MAKING:   Outcome Measure: Tool Used: Lower Extremity Functional Scale (LEFS)  Score:  Initial: 21/80 Most Recent: 70/80 (Date: 4/13/17)   Interpretation of Score: 20 questions each scored on a 5 point scale with 0 representing \"extreme difficulty or unable to perform\" and 4 representing \"no difficulty\".   The lower the score, the greater the functional disability. 80/80 represents no disability. Minimal detectable change is 9 points. TREATMENT:   (In addition to Assessment/Re-Assessment sessions the following treatments were rendered)  Pre-treatment Symptoms/Complaints: much less pain over last week, 3/10 at worst. Ready for discharge from therapy. Pain: Initial:     0/10 right side of low back Post Session:    0/10     THERAPEUTIC EXERCISE: (30 minutes):  Exercises per grid below to improve mobility and strength. Required moderate visual, verbal, manual and tactile cues to promote proper body alignment, promote proper body posture and promote proper body mechanics. Progressed resistance, range and repetitions as indicated. 3/17/17 3/21/17 3/24/17 3/27/17 3/30/17 4/5/17 4/7/17 4/11/17 4/13/17   Activity/Exercise            shotgun            iso hip flexion     3 x 20 sec         nustep or bike  Bike x 10 min level 3  airdyne x 10 min  Recumbent x 10 min level 3  Recumbent 10 min level 3  Recumbent 10 min  Recumbent bike 10 min  (level 3 hill) Recumbent 10 min level 3  Recumbent 10 min level 3  Recumbent 10 min level 3    shuttle     Next visit  100# x 10 DL  125# DL 3 x 10   75# SL x 10 B    Right knee to chest /left leg off table             Side gliding left standing             Bridge with ball squeeze             Foot tap over bolster (SLS balance)      X 10 B lat and fwb bkwd X 10 B lateral      Side stepping with band        GTB x 2 laps  GTB 2 laps with squat     Standing rotation correction  Right foot on table            Prone press up  X 10, 5 sec holds     X 10   X 5  X 5 with OP   Hip abd    hooklying with black band x 10, 5 sec   X 10      Balance activities      Next visit.  SL and DL, BF, bosu  Bolster foot tapping  x 10 B and in each direction  Balance board BF toe tapping BF with GTB toe tap x 20 B diagonals    Marching       2 laps  2 laps     Balance board       Next session  Lateral and A/P x 10min  Lateral and AP x 2 min each     Manual:  10 min  · grade IV PA's to left PSIS   · Grade IV CPA's and left UPA to lower lumbar   · Long distraction to left LE to decrease up slip  · Deep MFR to left QL   · Kinesio tape to SIJs       Treatment/Session Assessment:     · Response to Treatment: no pain    · Compliance with Program/Exercises: Will assess as treatment progresses.      Total Treatment Duration:  PT Patient Time In/Time Out  Time In: 0745  Time Out: [address] [place], PT

## 2017-05-12 ENCOUNTER — HOSPITAL ENCOUNTER (OUTPATIENT)
Dept: PHYSICAL THERAPY | Age: 32
Discharge: HOME | End: 2017-05-12
Attending: FAMILY MEDICINE
Payer: COMMERCIAL

## 2017-05-12 DIAGNOSIS — G89.29 CHRONIC MIDLINE LOW BACK PAIN WITHOUT SCIATICA: ICD-10-CM

## 2017-05-12 DIAGNOSIS — M54.50 CHRONIC MIDLINE LOW BACK PAIN WITHOUT SCIATICA: ICD-10-CM

## 2017-05-12 PROCEDURE — 97140 MANUAL THERAPY 1/> REGIONS: CPT

## 2017-05-12 PROCEDURE — 97162 PT EVAL MOD COMPLEX 30 MIN: CPT

## 2017-05-12 NOTE — PROGRESS NOTES
Ambulatory/Rehab Services H2 Model Falls Risk Assessment    Risk Factor Pts. ·   Confusion/Disorientation/Impulsivity  []    4 ·   Symptomatic Depression  []   2 ·   Altered Elimination  []   1 ·   Dizziness/Vertigo  []   1 ·   Gender (Male)  [x]   1 ·   Any administered antiepileptics (anticonvulsants):  []   2 ·   Any administered benzodiazepines:  []   1 ·   Visual Impairment (specify):  []   1 ·   Portable Oxygen Use  []   1 ·   Orthostatic ? BP  []   1 ·   History of Recent Falls (within 3 mos.)  []   5     Ability to Rise from Chair (choose one) Pts. ·   Ability to rise in a single movement  [x]   0 ·   Pushes up, successful in one attempt  []   1 ·   Multiple attempts, but successful  []   3 ·   Unable to rise without assistance  []   4   Total: (5 or greater = High Risk) 1     Falls Prevention Plan:   []                Physical Limitations to Exercise (specify):   []                Mobility Assistance Device (type):   []                Exercise/Equipment Adaptation (specify):    ©2010 [facility] [place] Patent #[id number].  Federal Law prohibits the replication, distribution or use without written permission from [facility] [employer]

## 2017-05-12 NOTE — PROGRESS NOTES
Farzaneh Melton  : 1985 [facility] at [facility]  [address], [address], [place]  [phone]   [phone]         OUTPATIENT PHYSICAL THERAPY:Initial Assessment 2017    ICD-10: Treatment Diagnosis: Low back pain (M54.5)  Precautions/Allergies:   Penicillins   Fall Risk Score: 1 (? 5 = High Risk)  MD Orders: PT eval and tx MEDICAL/REFERRING DIAGNOSIS:  Chronic midline low back pain without sciatica [M54.5, G89.29]   DATE OF ONSET: Current episode 17, last episode in 2017  REFERRING PHYSICIAN: Clementine Herrera MD  RETURN PHYSICIAN APPOINTMENT: none stated     INITIAL ASSESSMENT:  Mr. Kailash Jain presents to PT eval w/ c/o LBP that travels down his R leg. He leads a very active lifestyle and his pain is preventing him from participating in his favorite activities like biking, hiking, and lifting. He reports that he feels depressed because he hasn't been able to do the things he loves to due to his back pain. He displays tightness in his R hamstring and B piriformis muscles. Additionally, he is tender to palpation along his R lower lumbar area w/ tightness noted throughout. He will benefit from continued skilled PT services to improve deficits listed above and to progress towards his PLOF. PROBLEM LIST (Impacting functional limitations):  1. Decreased ADL/Functional Activities  2. Decreased Balance  3. Increased Pain  4. Decreased Activity Tolerance  5. Decreased Flexibility/Joint Mobility INTERVENTIONS PLANNED:  1. Balance Exercise  2. Cryotherapy  3. Electrical Stimulation  4. Heat  5. Home Exercise Program (HEP)  6. Manual Therapy  7. Neuromuscular Re-education/Strengthening  8. Range of Motion (ROM)  9. Therapeutic Activites  10. Therapeutic Exercise/Strengthening   TREATMENT PLAN:  Effective Dates: 17 TO 17.   Frequency/Duration: 2 times a week for 4 weeks  GOALS: (Goals have been discussed and agreed upon with patient.)  Short Term Functional Goals: 2 weeks  1. Pt will be independent w/ HEP in order to improve outcomes and decrease pain. 2. Pt will report pain of 4/10 or less after biking in order to improve functional mobility and return to prior activities. 3. 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 Discharge Goals: 4 weeks  1. Pt will have Oswestry score of 10 or less in order to report decreased pain and decreased functional impairments. 2. Pt will report pain of 2/10 or less after biking in order to improve functional mobility and return to prior activities. 3. 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 Rehabilitation Potential For Stated Goals: Good  Regarding Romario Philip therapy, I certify that the treatment plan above will be carried out by a therapist or under their direction. Thank you for this referral,  Arley Pruitt PT     Referring Physician Signature: Luis Manuel Rose MD              Date                    The information in this section was collected on 5/12/17 (except where otherwise noted). HISTORY:   History of Present Injury/Illness (Reason for Referral):  Pt reports pain started following a backpacking trip. He tried going to a Chiropractor, but it did not improve. He then attended PT in March of 2017 and his symptoms got better. On 5/5/17 he had a tough day at work and then drove to [employer] and his symptoms were exacerbated. Past Medical History/Comorbidities:   Mr. Sander Milner  has a past medical history of ADD (attention deficit disorder) and Anxiety. Mr. Sander Milner  has a past surgical history that includes cholecystectomy and lap,cholecystectomy. Social History/Living Environment:     Lives in [place] home w/ family.    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 Loves mountain biking, hiking, lifting, and being active. Personal Factors:          Sex:  male        Age:  36 y.o. Coping Style:  Pt reports he is depressed and chart review notes he has anxiety. Education:  0-2 years of college        Profession:  Construction delivery         Past/Current Experience:  PT prior that helped for a little while. Current Medications:       Current Outpatient Prescriptions:     busPIRone (BUSPAR) 7.5 mg tablet, Take 1 Tab by mouth two (2) times a day., Disp: 60 Tab, Rfl: 5   Date Last Reviewed:  5/12/2017   Number of Personal Factors/Comorbidities that affect the Plan of Care: 1-2: MODERATE COMPLEXITY   EXAMINATION:   Palpation:          Tightness noted in R QL, paraspinals, and obliques. Pelvis level w/ palpation. Special Tests:          - Scour: (-) on R and L        - Posterior impingement test: (+) on R        - Tightness in B piriformis muscles   Functional Mobility:         Gait/Ambulation:  Independent, smooth        Transfers:  Independent, no use of UEs        Bed Mobility:  Independent   Balance:          Decreased SLS on B LEs, with R more challenging than L   Sensation:         WFL w/ gross touch     Joint/Muscle ROM Strength    Hip flexion 120* R  120* L 5/5 R   5/5 L     Hamstrings 40* R  21* L     Hip extension  5/5 R  5/5 L    Knee extension  5/5 R  5/5 L    Knee flexion  5/5 R  5/5 L    DF  5/5 R  5/5 L                     Body Structures Involved:  1. Bones  2. Joints  3. Muscles Body Functions Affected:  1. Sensory/Pain  2. Neuromusculoskeletal  3. Movement Related Activities and Participation Affected:  1. General Tasks and Demands  2. Mobility  3.  Community, Social and [place]   Number of elements (examined above) that affect the Plan of Care: 3: MODERATE COMPLEXITY   CLINICAL PRESENTATION:   Presentation: Evolving clinical presentation with changing clinical characteristics: [address] MAKING:   Outcome Measure: Tool Used: Modified Oswestry Low Back Pain Questionnaire  Score:  Initial: 18/50  Most Recent: X/50 (Date: -- )   Interpretation of Score: Each section is scored on a 0-5 scale, 5 representing the greatest disability. The scores of each section are added together for a total score of 50. Score 0 1-10 11-20 21-30 31-40 41-49 50   Modifier CH CI CJ CK CL CM CN     ? Mobility - Walking and Moving Around:     - CURRENT STATUS: CJ - 20%-39% impaired, limited or restricted    - GOAL STATUS: CI - 1%-19% impaired, limited or restricted    - D/C STATUS:  ---------------To be determined---------------    Medical Necessity:   · Patient is expected to demonstrate progress in strength, range of motion and balance to decrease pain and functional impairments. Reason for Services/Other Comments:  · Patient continues to demonstrate capacity to improve strength, ROM, and balance  which will increase independence. Use of outcome tool(s) and clinical judgement create a POC that gives a: Questionable prediction of patient's progress: MODERATE COMPLEXITY            TREATMENT:   (In addition to Assessment/Re-Assessment sessions the following treatments were rendered)  Pre-treatment Symptoms/Complaints:  See above  Pain: Initial:     8/10 Post Session:  No change     THERAPEUTIC EXERCISE: (4 minutes):  Exercises per grid below to improve mobility. Required minimal verbal and tactile cues to promote proper body alignment, promote proper body posture and promote proper body mechanics. Progressed resistance and repetitions as indicated. MANUAL THERAPY: (8 minutes): Joint mobilization and Soft tissue mobilization was utilized and necessary because of the patient's restricted joint motion, painful spasm and loss of articular motion. Re-assessment was performed today (see above assessment section).   Separate time was dedicated today for this re-assessment.  -Long axis distraction 2x15 reps, grades 1-2s to R hip.  -Lateral distraction 2x10 reps, grades 1-2s to R hip.   -[place] and cupping to R QL for deep tissue work. Date:  5/12/17 Date:   Date:     Activity/Exercise Parameters Parameters Parameters   HEP Hamstring stretch, piriformis stretch, rotational trunk stretch                                               Treatment/Session Assessment:    · Response to Treatment:  Pt reported stretches and long axis distraction felt really good on his R side. · Compliance with Program/Exercises: Will assess as treatment progresses. · Recommendations/Intent for next treatment session: \"Next visit will focus on advancements to more challenging activities\".   Total Treatment Duration: 7:30-8:18       Veronica Villa, PT

## 2017-05-16 ENCOUNTER — HOSPITAL ENCOUNTER (OUTPATIENT)
Dept: PHYSICAL THERAPY | Age: 32
Discharge: HOME | End: 2017-05-16
Attending: FAMILY MEDICINE
Payer: COMMERCIAL

## 2017-05-16 PROCEDURE — 97140 MANUAL THERAPY 1/> REGIONS: CPT

## 2017-05-16 PROCEDURE — 97014 ELECTRIC STIMULATION THERAPY: CPT

## 2017-05-16 PROCEDURE — 97110 THERAPEUTIC EXERCISES: CPT

## 2017-05-16 NOTE — PROGRESS NOTES
José Camarillo  : 1985 [facility] Center at [facility]  [address], [address], [place]  [phone]   [phone]         OUTPATIENT PHYSICAL THERAPY:Daily Note 2017    ICD-10: Treatment Diagnosis: Low back pain (M54.5)  Precautions/Allergies:   Penicillins   Fall Risk Score: 1 (? 5 = High Risk)  MD Orders: PT eval and tx MEDICAL/REFERRING DIAGNOSIS:  Low Back Pain   DATE OF ONSET: Current episode 17, last episode in 2017  REFERRING PHYSICIAN: Rhett Earl MD  RETURN PHYSICIAN APPOINTMENT: none stated      ASSESSMENT:  Mr. Moises Richards presents to PT bobo w/ c/o LBP that travels down his R leg. He leads a very active lifestyle and his pain is preventing him from participating in his favorite activities like biking, hiking, and lifting. He reports that he feels depressed because he hasn't been able to do the things he loves to due to his back pain. He displays tightness in his R hamstring and B piriformis muscles. Additionally, he is tender to palpation along his R lower lumbar area w/ tightness noted throughout. He will benefit from continued skilled PT services to improve deficits listed above and to progress towards his PLOF. PROBLEM LIST (Impacting functional limitations):  1. Decreased ADL/Functional Activities  2. Decreased Balance  3. Increased Pain  4. Decreased Activity Tolerance  5. Decreased Flexibility/Joint Mobility INTERVENTIONS PLANNED:  1. Balance Exercise  2. Cryotherapy  3. Electrical Stimulation  4. Heat  5. Home Exercise Program (HEP)  6. Manual Therapy  7. Neuromuscular Re-education/Strengthening  8. Range of Motion (ROM)  9. Therapeutic Activites  10. Therapeutic Exercise/Strengthening   TREATMENT PLAN:  Effective Dates: 17 TO 17. Frequency/Duration: 2 times a week for 4 weeks  GOALS: (Goals have been discussed and agreed upon with patient.)  Short Term Functional Goals: 2 weeks  1.  Pt will be independent w/ HEP in order to improve outcomes and decrease pain. 2. Pt will report pain of 4/10 or less after biking in order to improve functional mobility and return to prior activities. 3. 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 Discharge Goals: 4 weeks  1. Pt will have Oswestry score of 10 or less in order to report decreased pain and decreased functional impairments. 2. Pt will report pain of 2/10 or less after biking in order to improve functional mobility and return to prior activities. 3. 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 Rehabilitation Potential For Stated Goals: Good              The information in this section was collected on 5/12/17 (except where otherwise noted). HISTORY:   History of Present Injury/Illness (Reason for Referral):  Pt reports pain started following a backpacking trip. He tried going to a Chiropractor, but it did not improve. He then attended PT in March of 2017 and his symptoms got better. On 5/5/17 he had a tough day at work and then drove to [employer] and his symptoms were exacerbated. Past Medical History/Comorbidities:   Mr. Lindsay Mancia  has a past medical history of ADD (attention deficit disorder) and Anxiety. Mr. Lindsay Mancia  has a past surgical history that includes cholecystectomy and lap,cholecystectomy. Social History/Living Environment:     Lives in 1 [place] home w/ family. 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 Loves mountain biking, hiking, lifting, and being active. Personal Factors:          Sex:  male        Age:  36 y.o. Coping Style:  Pt reports he is depressed and chart review notes he has anxiety.          Education:  0-2 years of college        Profession:  Construction delivery         Past/Current Experience:  PT prior that helped for a little while. Current Medications:       Current Outpatient Prescriptions:     busPIRone (BUSPAR) 7.5 mg tablet, Take 1 Tab by mouth two (2) times a day., Disp: 60 Tab, Rfl: 5   Date Last Reviewed:  5/16/2017   EXAMINATION:   Palpation:          Tightness noted in R QL, paraspinals, and obliques. Pelvis level w/ palpation. Special Tests:          - Scour: (-) on R and L        - Posterior impingement test: (+) on R        - Tightness in B piriformis muscles   Functional Mobility:         Gait/Ambulation:  Independent, smooth        Transfers:  Independent, no use of UEs        Bed Mobility:  Independent   Balance:          Decreased SLS on B LEs, with R more challenging than L   Sensation:         WFL w/ gross touch     Joint/Muscle ROM Strength    Hip flexion 120* R  120* L 5/5 R   5/5 L     Hamstrings 40* R  21* L     Hip extension  5/5 R  5/5 L    Knee extension  5/5 R  5/5 L    Knee flexion  5/5 R  5/5 L    DF  5/5 R  5/5 L                     Body Structures Involved:  1. Bones  2. Joints  3. Muscles Body Functions Affected:  1. Sensory/Pain  2. Neuromusculoskeletal  3. Movement Related Activities and Participation Affected:  1. General Tasks and Demands  2. Mobility  3. Community, Social and Civic Life   CLINICAL PRESENTATION:   CLINICAL DECISION MAKING:   Outcome Measure: Tool Used: Modified Oswestry Low Back Pain Questionnaire  Score:  Initial: 18/50  Most Recent: X/50 (Date: -- )   Interpretation of Score: Each section is scored on a 0-5 scale, 5 representing the greatest disability. The scores of each section are added together for a total score of 50. Score 0 1-10 11-20 21-30 31-40 41-49 50   Modifier CH CI CJ CK CL CM CN     ?  Mobility - Walking and Moving Around:     - CURRENT STATUS: CJ - 20%-39% impaired, limited or restricted    - GOAL STATUS: CI - 1%-19% impaired, limited or restricted    - D/C STATUS:  ---------------To be determined---------------    Medical Necessity:   · Patient is expected to demonstrate progress in strength, range of motion and balance to decrease pain and functional impairments. Reason for Services/Other Comments:  · Patient continues to demonstrate capacity to improve strength, ROM, and balance  which will increase independence. TREATMENT:   (In addition to Assessment/Re-Assessment sessions the following treatments were rendered)  Pre-treatment Symptoms/Complaints:  Pt reports he has had a really bad few days and had a panic attack because he was in so much pain. Pain: Initial:     9/10 Post Session:  3/10     THERAPEUTIC EXERCISE: (18 minutes):  Exercises per grid below to improve mobility. Required minimal verbal and tactile cues to promote proper body alignment, promote proper body posture and promote proper body mechanics. Progressed resistance and repetitions as indicated. MANUAL THERAPY: (15 minutes): Joint mobilization and Soft tissue mobilization was utilized and necessary because of the patient's restricted joint motion, painful spasm and loss of articular motion. MODALITIES: (15 minutes): *  Electrical Stimulation Therapy (H-wave) was provided with intensity adjusted throughout treatment to patient tolerance. High and low frequency. Intensity adjusted to tolerance.    -Long axis distraction 3x15 reps, grades 1-2s to L hip.  -Anterior glides 3x10 reps, grades 1-2s. -Deep tissue work to R hip flexor.      Date:  5/12/17 Date:  5/16/17 Date:     Activity/Exercise Parameters Parameters Parameters   HEP Hamstring stretch, piriformis stretch, rotational trunk stretch     Prone press ups  10x3    Hamstring stretch  3x30 sec, B LEs    Piriformis stretch  30 sec x3    Supine rotational trunk stretch  10x1, each side    Seated trunk stretch w/ exercise ball  15x1 stretching R side                Treatment/Session Assessment:    · Response to Treatment:  Pt reported decreased pain following tx session. He notes he still feels tightness in his R hip and low back but that it loosened up some. · Compliance with Program/Exercises: Will assess as treatment progresses. · Recommendations/Intent for next treatment session: \"Next visit will focus on advancements to more challenging activities\".   Total Treatment Duration: 7:30-8:18       Carmella Carrero, PT

## 2017-05-18 ENCOUNTER — HOSPITAL ENCOUNTER (OUTPATIENT)
Dept: PHYSICAL THERAPY | Age: 32
Discharge: HOME | End: 2017-05-18
Attending: FAMILY MEDICINE
Payer: COMMERCIAL

## 2017-05-18 PROCEDURE — 97110 THERAPEUTIC EXERCISES: CPT

## 2017-05-18 PROCEDURE — 97140 MANUAL THERAPY 1/> REGIONS: CPT

## 2017-05-18 NOTE — PROGRESS NOTES
Milta Sicard  : 1985 [facility]  [address], [address], [place]  [phone]   [phone]         OUTPATIENT PHYSICAL THERAPY:Daily Note 2017    ICD-10: Treatment Diagnosis: Low back pain (M54.5)  Precautions/Allergies:   Penicillins   Fall Risk Score: 1 (? 5 = High Risk)  MD Orders: PT eval and tx MEDICAL/REFERRING DIAGNOSIS:  Low Back Pain   DATE OF ONSET: Current episode 17, last episode in 2017  REFERRING PHYSICIAN: Barbara Sánchez MD  RETURN PHYSICIAN APPOINTMENT: none stated      ASSESSMENT:  Mr. Yary Javier presents to PT bobo w/ c/o LBP that travels down his R leg. He leads a very active lifestyle and his pain is preventing him from participating in his favorite activities like biking, hiking, and lifting. He reports that he feels depressed because he hasn't been able to do the things he loves to due to his back pain. He displays tightness in his R hamstring and B piriformis muscles. Additionally, he is tender to palpation along his R lower lumbar area w/ tightness noted throughout. He will benefit from continued skilled PT services to improve deficits listed above and to progress towards his PLOF. PROBLEM LIST (Impacting functional limitations):  1. Decreased ADL/Functional Activities  2. Decreased Balance  3. Increased Pain  4. Decreased Activity Tolerance  5. Decreased Flexibility/Joint Mobility INTERVENTIONS PLANNED:  1. Balance Exercise  2. Cryotherapy  3. Electrical Stimulation  4. Heat  5. Home Exercise Program (HEP)  6. Manual Therapy  7. Neuromuscular Re-education/Strengthening  8. Range of Motion (ROM)  9. Therapeutic Activites  10. Therapeutic Exercise/Strengthening   TREATMENT PLAN:  Effective Dates: 17 TO 17. Frequency/Duration: 2 times a week for 4 weeks  GOALS: (Goals have been discussed and agreed upon with patient.)  Short Term Functional Goals: 2 weeks  1.  Pt will be independent w/ HEP in order to improve outcomes and decrease pain. 2. Pt will report pain of 4/10 or less after biking in order to improve functional mobility and return to prior activities. 3. 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 Discharge Goals: 4 weeks  1. Pt will have Oswestry score of 10 or less in order to report decreased pain and decreased functional impairments. 2. Pt will report pain of 2/10 or less after biking in order to improve functional mobility and return to prior activities. 3. 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 Rehabilitation Potential For Stated Goals: Good              The information in this section was collected on 5/12/17 (except where otherwise noted). HISTORY:   History of Present Injury/Illness (Reason for Referral):  Pt reports pain started following a backpacking trip. He tried going to a Chiropractor, but it did not improve. He then attended PT in March of 2017 and his symptoms got better. On 5/5/17 he had a tough day at work and then drove to [employer] and his symptoms were exacerbated. Past Medical History/Comorbidities:   Mr. Alex Willis  has a past medical history of ADD (attention deficit disorder) and Anxiety. Mr. Alex Willis  has a past surgical history that includes cholecystectomy and lap,cholecystectomy. Social History/Living Environment:     Lives in 1 [place] home w/ family. 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 Loves mountain biking, hiking, lifting, and being active. Personal Factors:          Sex:  male        Age:  36 y.o. Coping Style:  Pt reports he is depressed and chart review notes he has anxiety.          Education:  0-2 years of college        Profession:  Construction delivery         Past/Current Experience:  PT prior that helped for a little while. Current Medications:       Current Outpatient Prescriptions:     busPIRone (BUSPAR) 7.5 mg tablet, Take 1 Tab by mouth two (2) times a day., Disp: 60 Tab, Rfl: 5   Date Last Reviewed:  5/18/2017   EXAMINATION:   Palpation:          Tightness noted in R QL, paraspinals, and obliques. Pelvis level w/ palpation. Special Tests:          - Scour: (-) on R and L        - Posterior impingement test: (+) on R        - Tightness in B piriformis muscles   Functional Mobility:         Gait/Ambulation:  Independent, smooth        Transfers:  Independent, no use of UEs        Bed Mobility:  Independent   Balance:          Decreased SLS on B LEs, with R more challenging than L   Sensation:         WFL w/ gross touch     Joint/Muscle ROM Strength    Hip flexion 120* R  120* L 5/5 R   5/5 L     Hamstrings 40* R  21* L     Hip extension  5/5 R  5/5 L    Knee extension  5/5 R  5/5 L    Knee flexion  5/5 R  5/5 L    DF  5/5 R  5/5 L                     Body Structures Involved:  1. Bones  2. Joints  3. Muscles Body Functions Affected:  1. Sensory/Pain  2. Neuromusculoskeletal  3. Movement Related Activities and Participation Affected:  1. General Tasks and Demands  2. Mobility  3. Community, Social and Civic Life   CLINICAL PRESENTATION:   CLINICAL DECISION MAKING:   Outcome Measure: Tool Used: Modified Oswestry Low Back Pain Questionnaire  Score:  Initial: 18/50  Most Recent: X/50 (Date: -- )   Interpretation of Score: Each section is scored on a 0-5 scale, 5 representing the greatest disability. The scores of each section are added together for a total score of 50. Score 0 1-10 11-20 21-30 31-40 41-49 50   Modifier CH CI CJ CK CL CM CN     ?  Mobility - Walking and Moving Around:     - CURRENT STATUS: CJ - 20%-39% impaired, limited or restricted    - GOAL STATUS: CI - 1%-19% impaired, limited or restricted    - D/C STATUS:  ---------------To be determined---------------    Medical Necessity:   · Patient is expected to demonstrate progress in strength, range of motion and balance to decrease pain and functional impairments. Reason for Services/Other Comments:  · Patient continues to demonstrate capacity to improve strength, ROM, and balance  which will increase independence. TREATMENT:   (In addition to Assessment/Re-Assessment sessions the following treatments were rendered)  Pre-treatment Symptoms/Complaints:  Pt reports he has less pain in this morning because he hasn't done anything yet. He reports that stress is probably a big factor in his pain levels. Pain: Initial:     0/10 Post Session:  0/10     THERAPEUTIC EXERCISE: (22 minutes):  Exercises per grid below to improve mobility, strength and balance. Required minimal verbal and tactile cues to promote proper body alignment, promote proper body posture and promote proper body mechanics. Progressed resistance, range and repetitions as indicated. MANUAL THERAPY: (20 minutes): Joint mobilization and Soft tissue mobilization was utilized and necessary because of the patient's restricted joint motion, painful spasm and loss of articular motion.   -Long axis distraction 3x15 reps, grades 1-2s to L hip.  -Anterior glides 3x10 reps, grades 1-2s. -Posterior glides 3x10 reps, grades 1-2s  - Lateral glids 2x10 reps, grades 1-2s. Symptoms reproduced. Pt educated on progressive relaxation technique for stress levels. Date:  5/12/17 Date:  5/16/17 Date:  5/18/17   Activity/Exercise Parameters Parameters Parameters   HEP Hamstring stretch, piriformis stretch, rotational trunk stretch  Hip flexor and adductor stretch. Progressive relaxation for stress levels.    Prone press ups  10x3 10x3   Hamstring stretch  3x30 sec, B LEs 3x30 sec, B LEs   Piriformis stretch  30 sec x3 30 sec x1, B LEs   Supine rotational trunk stretch  10x1, each side 10x1, each side   Seated trunk stretch w/ exercise ball  15x1 stretching R side    Standing side glide   10x3 to L, 10x1 to R   Heel taps   10x1, each side   Toe taps on cone   10x2, each side w/ no UE support   Hip flexor stretch   3x30 sec   Bridge   Ball btween knees, 10x1         Treatment/Session Assessment:    · Response to Treatment:  Pt reported low back and hip loosened up a good bit. Pt had considerable R hip flexor and R adductor tightness. · Compliance with Program/Exercises: Will assess as treatment progresses. · Recommendations/Intent for next treatment session: \"Next visit will focus on advancements to more challenging activities\".   Total Treatment Duration: 7:37-8:19       Carlos Alfonso PT

## 2017-05-23 ENCOUNTER — HOSPITAL ENCOUNTER (OUTPATIENT)
Dept: PHYSICAL THERAPY | Age: 32
Discharge: HOME | End: 2017-05-23
Attending: FAMILY MEDICINE
Payer: COMMERCIAL

## 2017-05-23 PROCEDURE — 97014 ELECTRIC STIMULATION THERAPY: CPT

## 2017-05-23 PROCEDURE — 97140 MANUAL THERAPY 1/> REGIONS: CPT

## 2017-05-23 NOTE — PROGRESS NOTES
Sia Number  : 1985 [facility] [address]  [address], [place] [address]  [phone]   [phone]         OUTPATIENT PHYSICAL THERAPY:Daily Note 2017    ICD-10: Treatment Diagnosis: Low back pain (M54.5)  Precautions/Allergies:   Penicillins   Fall Risk Score: 1 (? 5 = High Risk)  MD Orders: PT eval and tx MEDICAL/REFERRING DIAGNOSIS:  Low Back Pain   DATE OF ONSET: Current episode 17, last episode in 2017  REFERRING PHYSICIAN: Skeeter Sicard, MD  RETURN PHYSICIAN APPOINTMENT: none stated      ASSESSMENT:  Mr. Reeta Kehr presents to PT bobo w/ c/o LBP that travels down his R leg. He leads a very active lifestyle and his pain is preventing him from participating in his favorite activities like biking, hiking, and lifting. He reports that he feels depressed because he hasn't been able to do the things he loves to due to his back pain. He displays tightness in his R hamstring and B piriformis muscles. Additionally, he is tender to palpation along his R lower lumbar area w/ tightness noted throughout. He will benefit from continued skilled PT services to improve deficits listed above and to progress towards his PLOF. PROBLEM LIST (Impacting functional limitations):  1. Decreased ADL/Functional Activities  2. Decreased Balance  3. Increased Pain  4. Decreased Activity Tolerance  5. Decreased Flexibility/Joint Mobility INTERVENTIONS PLANNED:  1. Balance Exercise  2. Cryotherapy  3. Electrical Stimulation  4. Heat  5. Home Exercise Program (HEP)  6. Manual Therapy  7. Neuromuscular Re-education/Strengthening  8. Range of Motion (ROM)  9. Therapeutic Activites  10. Therapeutic Exercise/Strengthening   TREATMENT PLAN:  Effective Dates: 17 TO 17. Frequency/Duration: 2 times a week for 4 weeks  GOALS: (Goals have been discussed and agreed upon with patient.)  Short Term Functional Goals: 2 weeks  1.  Pt will be independent w/ HEP in order to improve outcomes and decrease pain. 2. Pt will report pain of 4/10 or less after biking in order to improve functional mobility and return to prior activities. 3. 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 Discharge Goals: 4 weeks  1. Pt will have Oswestry score of 10 or less in order to report decreased pain and decreased functional impairments. 2. Pt will report pain of 2/10 or less after biking in order to improve functional mobility and return to prior activities. 3. 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 Rehabilitation Potential For Stated Goals: Good              The information in this section was collected on 5/12/17 (except where otherwise noted). HISTORY:   History of Present Injury/Illness (Reason for Referral):  Pt reports pain started following a backpacking trip. He tried going to a Chiropractor, but it did not improve. He then attended PT in March of 2017 and his symptoms got better. On 5/5/17 he had a tough day at work and then drove to [employer] and his symptoms were exacerbated. Past Medical History/Comorbidities:   Mr. Shane Morales  has a past medical history of ADD (attention deficit disorder) and Anxiety. Mr. Shane Morales  has a past surgical history that includes cholecystectomy and lap,cholecystectomy. Social History/Living Environment:     Lives in 1 [place] home w/ family. 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 Loves mountain biking, hiking, lifting, and being active. Personal Factors:          Sex:  male        Age:  36 y.o. Coping Style:  Pt reports he is depressed and chart review notes he has anxiety.          Education:  0-2 years of college        Profession:  Construction delivery         Past/Current Experience:  PT prior that helped for a little while. Current Medications:       Current Outpatient Prescriptions:     Mth-Me Blue-Sod Phos-PhSal-Hyo (URIBEL) 118-10-40.8-36 mg cap capsule, Take 1 Cap by mouth four (4) times daily. , Disp: 30 Cap, Rfl: 2    busPIRone (BUSPAR) 7.5 mg tablet, Take 1 Tab by mouth two (2) times a day., Disp: 60 Tab, Rfl: 5   Date Last Reviewed:  5/23/2017   EXAMINATION:   Palpation:          Tightness noted in R QL, paraspinals, and obliques. Pelvis level w/ palpation. Special Tests:          - Scour: (-) on R and L        - Posterior impingement test: (+) on R        - Tightness in B piriformis muscles   Functional Mobility:         Gait/Ambulation:  Independent, smooth        Transfers:  Independent, no use of UEs        Bed Mobility:  Independent   Balance:          Decreased SLS on B LEs, with R more challenging than L   Sensation:         WFL w/ gross touch     Joint/Muscle ROM Strength    Hip flexion 120* R  120* L 5/5 R   5/5 L     Hamstrings 40* R  21* L     Hip extension  5/5 R  5/5 L    Knee extension  5/5 R  5/5 L    Knee flexion  5/5 R  5/5 L    DF  5/5 R  5/5 L                     Body Structures Involved:  1. Bones  2. Joints  3. Muscles Body Functions Affected:  1. Sensory/Pain  2. Neuromusculoskeletal  3. Movement Related Activities and Participation Affected:  1. General Tasks and Demands  2. Mobility  3. Community, Social and Civic Life   CLINICAL PRESENTATION:   CLINICAL DECISION MAKING:   Outcome Measure: Tool Used: Modified Oswestry Low Back Pain Questionnaire  Score:  Initial: 18/50  Most Recent: X/50 (Date: -- )   Interpretation of Score: Each section is scored on a 0-5 scale, 5 representing the greatest disability. The scores of each section are added together for a total score of 50. Score 0 1-10 11-20 21-30 31-40 41-49 50   Modifier CH CI CJ CK CL CM CN     ?  Mobility - Walking and Moving Around:     - CURRENT STATUS: CJ - 20%-39% impaired, limited or restricted    - GOAL STATUS: CI - 1%-19% impaired, limited or restricted    - D/C STATUS:  ---------------To be determined---------------    Medical Necessity:   · Patient is expected to demonstrate progress in strength, range of motion and balance to decrease pain and functional impairments. Reason for Services/Other Comments:  · Patient continues to demonstrate capacity to improve strength, ROM, and balance  which will increase independence. TREATMENT:   (In addition to Assessment/Re-Assessment sessions the following treatments were rendered)  Pre-treatment Symptoms/Complaints: Pt reports he is in a great deal of pain today. He notes that when he is lying down and not working he feels better but the pain returns immediately w/ work. Pt reports he is seeing a spine specialist for his back pain Wednesday (5/24/17). Pain: Initial:     6/10 Post Session:  3/10     THERAPEUTIC EXERCISE: (5 minutes):  Exercises per grid below to improve mobility, strength and balance. Required minimal verbal and tactile cues to promote proper body alignment, promote proper body posture and promote proper body mechanics. Progressed resistance, range and repetitions as indicated. MANUAL THERAPY: (24 minutes): Joint mobilization and Soft tissue mobilization was utilized and necessary because of the patient's restricted joint motion, painful spasm and loss of articular motion. MODALITIES: (15 minutes): *  Electrical Stimulation Therapy (H-wave) was provided with intensity adjusted throughout treatment to patient tolerance. Hip flexors and adductors on low frequency and intensity adjusted to tolerance.   -Long axis distraction 3x15 reps, grades 1-2s to L hip.  -Posterior glides 3x10 reps, grades 1-2s  - Lateral glids 1x10 reps, grades 1-2s. Symptoms reproduced. - Deep tissue work to hip flexors and adductors w/ graston tools. Pt educated on progressive relaxation technique for stress levels. Date:  5/12/17 Date:  5/16/17 Date:  5/18/17 Date:  5/23/17   Activity/Exercise Parameters Parameters Parameters    HEP Hamstring stretch, piriformis stretch, rotational trunk stretch  Hip flexor and adductor stretch. Progressive relaxation for stress levels. Prone press ups  10x3 10x3    Hamstring stretch  3x30 sec, B LEs 3x30 sec, B LEs    Piriformis stretch  30 sec x3 30 sec x1, B LEs    Supine rotational trunk stretch  10x1, each side 10x1, each side    Seated trunk stretch w/ exercise ball  15x1 stretching R side     Standing side glide   10x3 to L, 10x1 to R 10x3 to L   Heel taps   10x1, each side    Toe taps on cone   10x2, each side w/ no UE support    Hip flexor stretch   3x30 sec 3x 30 sec   Bridge   Ball btween knees, 10x1          Treatment/Session Assessment:    · Response to Treatment:  Pt still very tight and tender along hip flexors and adductors. H-wave decreased his pain along w/ L side glides. · Compliance with Program/Exercises: Will assess as treatment progresses. · Recommendations/Intent for next treatment session: \"Next visit will focus on advancements to more challenging activities\".     PT Patient Time In/Time Out  Time In: 0730  Time Out: [address] [place], PT

## 2017-05-25 ENCOUNTER — HOSPITAL ENCOUNTER (OUTPATIENT)
Dept: PHYSICAL THERAPY | Age: 32
Discharge: HOME | End: 2017-05-25
Attending: FAMILY MEDICINE
Payer: COMMERCIAL

## 2017-05-25 PROCEDURE — 97110 THERAPEUTIC EXERCISES: CPT

## 2017-05-25 NOTE — PROGRESS NOTES
Milta Sicard  : 1985 [facility] at [facility]  [address], [address], [place]  [phone]   [phone]         OUTPATIENT PHYSICAL THERAPY:Daily Note 2017    ICD-10: Treatment Diagnosis: Low back pain (M54.5)  Precautions/Allergies:   Penicillins   Fall Risk Score: 1 (? 5 = High Risk)  MD Orders: PT eval and tx MEDICAL/REFERRING DIAGNOSIS:  Low Back Pain   DATE OF ONSET: Current episode 17, last episode in 2017  REFERRING PHYSICIAN: Barbara Sánchez MD  RETURN PHYSICIAN APPOINTMENT: none stated      ASSESSMENT:  Mr. Yary Javier presents to PT bobo w/ c/o LBP that travels down his R leg. He leads a very active lifestyle and his pain is preventing him from participating in his favorite activities like biking, hiking, and lifting. He reports that he feels depressed because he hasn't been able to do the things he loves to due to his back pain. He displays tightness in his R hamstring and B piriformis muscles. Additionally, he is tender to palpation along his R lower lumbar area w/ tightness noted throughout. He will benefit from continued skilled PT services to improve deficits listed above and to progress towards his PLOF. PROBLEM LIST (Impacting functional limitations):  1. Decreased ADL/Functional Activities  2. Decreased Balance  3. Increased Pain  4. Decreased Activity Tolerance  5. Decreased Flexibility/Joint Mobility INTERVENTIONS PLANNED:  1. Balance Exercise  2. Cryotherapy  3. Electrical Stimulation  4. Heat  5. Home Exercise Program (HEP)  6. Manual Therapy  7. Neuromuscular Re-education/Strengthening  8. Range of Motion (ROM)  9. Therapeutic Activites  10. Therapeutic Exercise/Strengthening   TREATMENT PLAN:  Effective Dates: 17 TO 17. Frequency/Duration: 2 times a week for 4 weeks  GOALS: (Goals have been discussed and agreed upon with patient.)  Short Term Functional Goals: 2 weeks  1.  Pt will be independent w/ HEP in order to improve outcomes and decrease pain. 2. Pt will report pain of 4/10 or less after biking in order to improve functional mobility and return to prior activities. 3. 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 Discharge Goals: 4 weeks  1. Pt will have Oswestry score of 10 or less in order to report decreased pain and decreased functional impairments. 2. Pt will report pain of 2/10 or less after biking in order to improve functional mobility and return to prior activities. 3. 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 Rehabilitation Potential For Stated Goals: Good              The information in this section was collected on 5/12/17 (except where otherwise noted). HISTORY:   History of Present Injury/Illness (Reason for Referral):  Pt reports pain started following a backpacking trip. He tried going to a Chiropractor, but it did not improve. He then attended PT in March of 2017 and his symptoms got better. On 5/5/17 he had a tough day at work and then [place] to [place] and his symptoms were exacerbated. Past Medical History/Comorbidities:   Mr. Moises Richards  has a past medical history of ADD (attention deficit disorder) and Anxiety. Mr. Moises Richards  has a past surgical history that includes cholecystectomy and lap,cholecystectomy. Social History/Living Environment:     Lives in 1 [place] home w/ family. 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 Loves mountain biking, hiking, lifting, and being active. Personal Factors:          Sex:  male        Age:  36 y.o. Coping Style:  Pt reports he is depressed and chart review notes he has anxiety.          Education:  0-2 years of college        Profession:  Construction delivery         Past/Current Experience:  PT prior that helped for a little while. Current Medications:       Current Outpatient Prescriptions:     [facility] Blue-Sod Phos-PhSal-Hyo (URIBEL) 118-10-40.8-36 mg cap capsule, Take 1 Cap by mouth four (4) times daily. , Disp: 30 Cap, Rfl: 2    busPIRone (BUSPAR) 7.5 mg tablet, Take 1 Tab by mouth two (2) times a day., Disp: 60 Tab, Rfl: 5   Pt added gabapentin and prednisone on 5/24/17. Date Last Reviewed:  5/25/2017   EXAMINATION:   Palpation:          Tightness noted in R QL, paraspinals, and obliques. Pelvis level w/ palpation. Special Tests:          - Scour: (-) on R and L        - Posterior impingement test: (+) on R        - Tightness in B piriformis muscles   Functional Mobility:         Gait/Ambulation:  Independent, smooth        Transfers:  Independent, no use of UEs        Bed Mobility:  Independent   Balance:          Decreased SLS on B LEs, with R more challenging than L   Sensation:         WFL w/ gross touch     Joint/Muscle ROM Strength    Hip flexion 120* R  120* L 5/5 R   5/5 L     Hamstrings 40* R  21* L     Hip extension  5/5 R  5/5 L    Knee extension  5/5 R  5/5 L    Knee flexion  5/5 R  5/5 L    DF  5/5 R  5/5 L                     Body Structures Involved:  1. Bones  2. Joints  3. Muscles Body Functions Affected:  1. Sensory/Pain  2. Neuromusculoskeletal  3. Movement Related Activities and Participation Affected:  1. General Tasks and Demands  2. Mobility  3. Community, Social and Civic Life   CLINICAL PRESENTATION:   CLINICAL DECISION MAKING:   Outcome Measure: Tool Used: Modified Oswestry Low Back Pain Questionnaire  Score:  Initial: 18/50  Most Recent: X/50 (Date: -- )   Interpretation of Score: Each section is scored on a 0-5 scale, 5 representing the greatest disability. The scores of each section are added together for a total score of 50. Score 0 1-10 11-20 21-30 31-40 41-49 50   Modifier CH CI CJ CK CL CM CN     ?  Mobility - Walking and Moving Around:     - CURRENT STATUS: CJ - 20%-39% impaired, limited or restricted    - GOAL STATUS: CI - 1%-19% impaired, limited or restricted    - D/C STATUS:  ---------------To be determined---------------    Medical Necessity:   · Patient is expected to demonstrate progress in strength, range of motion and balance to decrease pain and functional impairments. Reason for Services/Other Comments:  · Patient continues to demonstrate capacity to improve strength, ROM, and balance  which will increase independence. TREATMENT:   (In addition to Assessment/Re-Assessment sessions the following treatments were rendered)  Pre-treatment Symptoms/Complaints: Pt reports he saw the spine specialist who thinks he has chemical irritant sciatica and put him on light duty. MD put pt on gabapentin and prednisone. Pain: Initial:     4/10 Post Session:  1/10     THERAPEUTIC EXERCISE: (47 minutes):  Exercises per grid below to improve mobility, strength and balance. Required minimal verbal and tactile cues to promote proper body alignment, promote proper body posture and promote proper body mechanics. Progressed resistance, range and repetitions as indicated. - Pt educated on kinesio tape wear. Instructed to not heat tape and to monitor for irritation. If irritated, pt told to remove tape. Kinesio tape applied to low back area for support and cueing.   - Pt educated on core strengthening and keeping back in neutral throughout the day. Date:  5/12/17 Date:  5/16/17 Date:  5/18/17 Date:  5/23/17 Date:  5/25/17   Activity/Exercise Parameters Parameters Parameters Parameters Parameters   HEP Hamstring stretch, piriformis stretch, rotational trunk stretch  Hip flexor and adductor stretch. Progressive relaxation for stress levels.      Prone press ups  10x3 10x3     Hamstring stretch  3x30 sec, B LEs 3x30 sec, B LEs  3x30 sec on R LE   Piriformis stretch  30 sec x3 30 sec x1, B LEs  3x30 sec, each side   Supine rotational trunk stretch  10x1, each side 10x1, each side     Seated trunk stretch w/ exercise ball  15x1 stretching R side      Standing side glide   10x3 to L, 10x1 to R 10x3 to L 10x1 to L   Heel taps   10x1, each side     Toe taps on cone   10x2, each side w/ no UE support     Hip flexor stretch   3x30 sec 3x 30 sec 3x30 sec in supine and 3x30 seconds in half kneeling   Bridge   Ball btween knees, 10x1     Pallof press     10x1 each side, GTB   Chops/lifts     10x each direction each side, GTB in half kneel   Table top to lift off     10x   Prone opp arm and leg extension     15x each side   Cat camel      10x   Foam roller     Hip flexors, abd, and adductors    Opposite KTC     3x 30 sec on R          Treatment/Session Assessment:    · Response to Treatment:  Pt reported exercises felt really good and challenged his core. He reported decreased pain following session of stretching and exercises. · Compliance with Program/Exercises: Will assess as treatment progresses. · Recommendations/Intent for next treatment session: \"Next visit will focus on advancements to more challenging activities\".     PT Patient Time In/Time Out  Time In: 0730  Time Out: [address] Ambrosio PT

## 2017-05-30 ENCOUNTER — HOSPITAL ENCOUNTER (OUTPATIENT)
Dept: PHYSICAL THERAPY | Age: 32
Discharge: HOME | End: 2017-05-30
Attending: FAMILY MEDICINE
Payer: COMMERCIAL

## 2017-05-30 PROCEDURE — 97110 THERAPEUTIC EXERCISES: CPT

## 2017-05-30 NOTE — PROGRESS NOTES
Jerome Winkler  : 1985 [facility] at [facility]  [address], [address], [place]  [phone]   [phone]         OUTPATIENT PHYSICAL THERAPY:Daily Note, Progress Report and Discharge 2017    ICD-10: Treatment Diagnosis: Low back pain (M54.5)  Precautions/Allergies:   Penicillins   Fall Risk Score: 1 (? 5 = High Risk)  MD Orders: PT eval and tx MEDICAL/REFERRING DIAGNOSIS:  Low Back Pain   DATE OF ONSET: Current episode 17, last episode in 2017  REFERRING PHYSICIAN: Frank Stringer MD  RETURN PHYSICIAN APPOINTMENT: none stated      ASSESSMENT:  Mr. Grady Thakkar attended 6 PT sessions for low back pain that was radiating down his R LE. He has greatly reduced his pain and has progressed towards his goals. He is self-pay and therefore ending his PT sessions and hoping to maintain and progress w/ a HEP. He was issued a comprehensive core strengthening HEP and has demonstrated compliance and independence w/ it in the clinic. His Oswestry score decreased 4 points showing a decrease in self reported functional impairments and pain levels. At this time, he is being DC'd from PT due to pt preference based on finances. GOALS: (Goals have been discussed and agreed upon with patient.)  Short Term Functional Goals: 2 weeks  1. Pt will be independent w/ HEP in order to improve outcomes and decrease pain. MET  2. Pt will report pain of 4/10 or less after biking in order to improve functional mobility and return to prior activities. PROGRESSING  3. 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 MET    Discharge Goals: 4 weeks  1. Pt will have Oswestry score of 10 or less in order to report decreased pain and decreased functional impairments. PROGRESSING  2.  Pt will report pain of 2/10 or less after biking in order to improve functional mobility and return to prior activities. PROGRESSING  3. 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 PROGRESSING, pt reports 2-3/10                The information in this section was collected on 5/12/17 (except where otherwise noted). HISTORY:   History of Present Injury/Illness (Reason for Referral):  Pt reports pain started following a backpacking trip. He tried going to a Chiropractor, but it did not improve. He then attended PT in March of 2017 and his symptoms got better. On 5/5/17 he had a tough day at work and then drove to [place] and his symptoms were exacerbated. Past Medical History/Comorbidities:   Mr. Delores Rodrigues  has a past medical history of ADD (attention deficit disorder) and Anxiety. Mr. Delores Rodrigues  has a past surgical history that includes cholecystectomy and lap,cholecystectomy. Social History/Living Environment:     Lives in 1 [place] home w/ family. 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 Loves mountain biking, hiking, lifting, and being active. Personal Factors:          Sex:  male        Age:  36 y.o. Coping Style:  Pt reports he is depressed and chart review notes he has anxiety. Education:  0-2 years of college        Profession:  Construction delivery         Past/Current Experience:  PT prior that helped for a little while. Current Medications:       Current Outpatient Prescriptions:     [facility] Blue-Sod Phos-PhSal-Hyo (URIBEL) 118-10-40.8-36 mg cap capsule, Take 1 Cap by mouth four (4) times daily. , Disp: 30 Cap, Rfl: 2    busPIRone (BUSPAR) 7.5 mg tablet, Take 1 Tab by mouth two (2) times a day., Disp: 60 Tab, Rfl: 5   Pt added gabapentin and prednisone on 5/24/17. Date Last Reviewed:  5/30/2017   EXAMINATION:   Palpation:          Tightness noted in R QL, paraspinals, and obliques. Pelvis level w/ palpation.    Special Tests:          - Scour: (-) on R and L        - Posterior impingement test: (+) on R        - Tightness in B piriformis muscles   Functional Mobility:         Gait/Ambulation:  Independent, smooth        Transfers:  Independent, no use of UEs        Bed Mobility:  Independent   Balance:          Decreased SLS on B LEs, with R more challenging than L   Sensation:         WFL w/ gross touch     Joint/Muscle ROM Strength    Hip flexion 120* R   120* L 5/5 R   5/5 L     Hamstrings 40* R UPDATE: 30*  21* L UPDATE: 20*     Hip extension  5/5 R  5/5 L    Knee extension  5/5 R  5/5 L    Knee flexion  5/5 R  5/5 L    DF  5/5 R  5/5 L                     Body Structures Involved:  1. Bones  2. Joints  3. Muscles Body Functions Affected:  1. Sensory/Pain  2. Neuromusculoskeletal  3. Movement Related Activities and Participation Affected:  1. General Tasks and Demands  2. Mobility  3. Community, Social and Civic Life   CLINICAL PRESENTATION:   CLINICAL DECISION MAKING:   Outcome Measure: Tool Used: Modified Oswestry Low Back Pain Questionnaire  Score:  Initial: 18/50  Most Recent: 14/50 (Date: -- )   Interpretation of Score: Each section is scored on a 0-5 scale, 5 representing the greatest disability. The scores of each section are added together for a total score of 50. Score 0 1-10 11-20 21-30 31-40 41-49 50   Modifier CH CI CJ CK CL CM CN     ? Mobility - Walking and Moving Around:     - CURRENT STATUS: CJ - 20%-39% impaired, limited or restricted    - GOAL STATUS: CJ - 20%-39% impaired, limited or restricted    - D/C STATUS:  CJ - 20%-39% impaired, limited or restricted              TREATMENT:   (In addition to Assessment/Re-Assessment sessions the following treatments were rendered)  Pre-treatment Symptoms/Complaints: Pt reports he had a flare up after the prednisone but it seems to have calmed down.    Pain: Initial:     3/10 Post Session:  0.5/10     THERAPEUTIC EXERCISE: (45 minutes):  Exercises per grid below to improve mobility, strength and balance. Required minimal verbal and tactile cues to promote proper body alignment, promote proper body posture and promote proper body mechanics. Progressed resistance, range and repetitions as indicated. - Pt educated on core strengthening and keeping back in neutral throughout the day. Date:  5/12/17 Date:  5/16/17 Date:  5/18/17 Date:  5/23/17 Date:  5/25/17 Date:  5/30/17   Activity/Exercise Parameters Parameters Parameters Parameters Parameters Parameters   HEP Hamstring stretch, piriformis stretch, rotational trunk stretch  Hip flexor and adductor stretch. Progressive relaxation for stress levels.    Pt issued HEP and instructed on safety   Prone press ups  10x3 10x3      Hamstring stretch  3x30 sec, B LEs 3x30 sec, B LEs  3x30 sec on R LE 3x20 sec each side   Piriformis stretch  30 sec x3 30 sec x1, B LEs  3x30 sec, each side 3x20 sec, each side   Supine rotational trunk stretch  10x1, each side 10x1, each side      Seated trunk stretch w/ exercise ball  15x1 stretching R side       Standing side glide   10x3 to L, 10x1 to R 10x3 to L 10x1 to L    Heel taps   10x1, each side      Toe taps on cone   10x2, each side w/ no UE support      Hip flexor stretch   3x30 sec 3x 30 sec 3x30 sec in supine and 3x30 seconds in half kneeling 3x20 sec in half kneel   Bridge   Ball btween knees, 10x1   x10    Pallof press     10x1 each side, GTB 10x1, each side, GTB   Chops/lifts     10x each direction each side, GTB in half kneel 10x, each side, GTB in half kneel   Table top to lift off     10x 10x1 each side   Prone opp arm and leg extension     15x each side 10x each side   Cat camel      10x 10x   Foam roller     Hip flexors, abd, and adductors     Opposite KTC     3x 30 sec on R     Bird dog      10x each side   Plank and side plank      1x each to tolerance   [place] twist w/ legs on ground      1 lb weight x10    Dead bugs      10x                  Treatment/Session Assessment: · Response to Treatment:  Pt reported exercises felt really good and challenged his core. He reported decreased pain following session of stretching and exercises. He feels confident he can complete HEP.    · Compliance with Program/Exercises: Independent w/ HEP    PT Patient Time In/Time Out  Time In: 0728  Time Out: [address] Edgar Ambrosio PT

## 2017-06-01 ENCOUNTER — APPOINTMENT (OUTPATIENT)
Dept: PHYSICAL THERAPY | Age: 32
End: 2017-06-01
Attending: FAMILY MEDICINE

## 2017-06-06 ENCOUNTER — APPOINTMENT (OUTPATIENT)
Dept: PHYSICAL THERAPY | Age: 32
End: 2017-06-06
Attending: FAMILY MEDICINE

## 2017-06-08 ENCOUNTER — APPOINTMENT (OUTPATIENT)
Dept: PHYSICAL THERAPY | Age: 32
End: 2017-06-08
Attending: FAMILY MEDICINE

## 2022-02-10 ENCOUNTER — NURSE TRIAGE (OUTPATIENT)
Dept: OTHER | Facility: CLINIC | Age: 37
End: 2022-02-10

## 2022-02-10 NOTE — TELEPHONE ENCOUNTER
Received call from [place] at [facility] with Red Flag Complaint. Subjective: Caller states \"he had a televisit with Dr Brenna Merlin yesterday, right ear pain. He was given care advice, but none is working, and ear is still hurting and pressure. \"     Current Symptoms: right ear pain and pressure, the outer part of the ear hurts, migraine headache    Onset: 4 days ago; worsening    Associated Symptoms: NA    Pain Severity: 3/10; sharp and pressure; constant    Temperature: denies fever      What has been tried: warm compresses, ear drops    LMP: NA Pregnant: NA    Recommended disposition: See in office today or tomorrow, was told walk in[place] if no appointments    Care advice provided, patient verbalizes understanding; denies any other questions or concerns; instructed to call back for any new or worsening symptoms. Writer provided warm transfer to Karmen Mccurdy at [facility] for appointment scheduling    Attention Provider: Thank you for allowing me to participate in the care of your patient. The patient was connected to triage in response to information provided to the [facility]. Please do not respond through this encounter as the response is not directed to a shared pool.       Reason for Disposition   Patient wants to be seen    Protocols used: EARACHE-ADULT-[place]

## 2022-03-29 ENCOUNTER — NURSE TRIAGE (OUTPATIENT)
Dept: OTHER | Facility: CLINIC | Age: 37
End: 2022-03-29

## 2022-07-12 DIAGNOSIS — Z00.00 ROUTINE PHYSICAL EXAMINATION: Primary | ICD-10-CM

## 2022-07-13 ENCOUNTER — NURSE ONLY (OUTPATIENT)
Dept: FAMILY MEDICINE CLINIC | Facility: CLINIC | Age: 37
End: 2022-07-13

## 2022-07-13 DIAGNOSIS — Z00.00 ROUTINE PHYSICAL EXAMINATION: ICD-10-CM

## 2022-07-13 LAB
ALBUMIN SERPL-MCNC: 4.1 G/DL (ref 3.5–5)
ALBUMIN/GLOB SERPL: 1.3 {RATIO} (ref 1.2–3.5)
ALP SERPL-CCNC: 144 U/L (ref 50–136)
ALT SERPL-CCNC: 32 U/L (ref 12–65)
ANION GAP SERPL CALC-SCNC: 10 MMOL/L (ref 7–16)
AST SERPL-CCNC: 17 U/L (ref 15–37)
BASOPHILS # BLD: 0.1 K/UL (ref 0–0.2)
BASOPHILS NFR BLD: 1 % (ref 0–2)
BILIRUB SERPL-MCNC: 0.3 MG/DL (ref 0.2–1.1)
BUN SERPL-MCNC: 11 MG/DL (ref 6–23)
CALCIUM SERPL-MCNC: 9.6 MG/DL (ref 8.3–10.4)
CHLORIDE SERPL-SCNC: 108 MMOL/L (ref 98–107)
CHOLEST SERPL-MCNC: 206 MG/DL
CO2 SERPL-SCNC: 22 MMOL/L (ref 21–32)
CREAT SERPL-MCNC: 1 MG/DL (ref 0.8–1.5)
DIFFERENTIAL METHOD BLD: NORMAL
EOSINOPHIL # BLD: 0.2 K/UL (ref 0–0.8)
EOSINOPHIL NFR BLD: 3 % (ref 0.5–7.8)
ERYTHROCYTE [DISTWIDTH] IN BLOOD BY AUTOMATED COUNT: 12.7 % (ref 11.9–14.6)
GLOBULIN SER CALC-MCNC: 3.1 G/DL (ref 2.3–3.5)
GLUCOSE SERPL-MCNC: 126 MG/DL (ref 65–100)
HCT VFR BLD AUTO: 45.7 % (ref 41.1–50.3)
HDLC SERPL-MCNC: 47 MG/DL (ref 40–60)
HDLC SERPL: 4.4 {RATIO}
HGB BLD-MCNC: 15.1 G/DL (ref 13.6–17.2)
IMM GRANULOCYTES # BLD AUTO: 0 K/UL (ref 0–0.5)
IMM GRANULOCYTES NFR BLD AUTO: 0 % (ref 0–5)
LDLC SERPL CALC-MCNC: 103 MG/DL
LYMPHOCYTES # BLD: 2.3 K/UL (ref 0.5–4.6)
LYMPHOCYTES NFR BLD: 35 % (ref 13–44)
MCH RBC QN AUTO: 27.8 PG (ref 26.1–32.9)
MCHC RBC AUTO-ENTMCNC: 33 G/DL (ref 31.4–35)
MCV RBC AUTO: 84 FL (ref 79.6–97.8)
MONOCYTES # BLD: 0.4 K/UL (ref 0.1–1.3)
MONOCYTES NFR BLD: 7 % (ref 4–12)
NEUTS SEG # BLD: 3.5 K/UL (ref 1.7–8.2)
NEUTS SEG NFR BLD: 54 % (ref 43–78)
NRBC # BLD: 0 K/UL (ref 0–0.2)
PLATELET # BLD AUTO: 238 K/UL (ref 150–450)
PMV BLD AUTO: 12.1 FL (ref 9.4–12.3)
POTASSIUM SERPL-SCNC: 4.2 MMOL/L (ref 3.5–5.1)
PROT SERPL-MCNC: 7.2 G/DL (ref 6.3–8.2)
RBC # BLD AUTO: 5.44 M/UL (ref 4.23–5.6)
SODIUM SERPL-SCNC: 140 MMOL/L (ref 136–145)
TRIGL SERPL-MCNC: 280 MG/DL (ref 35–150)
TSH, 3RD GENERATION: 0.84 UIU/ML (ref 0.36–3.74)
VLDLC SERPL CALC-MCNC: 56 MG/DL (ref 6–23)
WBC # BLD AUTO: 6.5 K/UL (ref 4.3–11.1)

## 2022-07-20 ENCOUNTER — OFFICE VISIT (OUTPATIENT)
Dept: FAMILY MEDICINE CLINIC | Facility: CLINIC | Age: 37
End: 2022-07-20
Payer: COMMERCIAL

## 2022-07-20 VITALS
WEIGHT: 199.8 LBS | BODY MASS INDEX: 27.97 KG/M2 | DIASTOLIC BLOOD PRESSURE: 70 MMHG | HEIGHT: 71 IN | SYSTOLIC BLOOD PRESSURE: 120 MMHG

## 2022-07-20 DIAGNOSIS — R73.9 ELEVATED BLOOD SUGAR: ICD-10-CM

## 2022-07-20 DIAGNOSIS — F41.9 ANXIETY: ICD-10-CM

## 2022-07-20 DIAGNOSIS — E78.1 PURE HYPERGLYCERIDEMIA: ICD-10-CM

## 2022-07-20 DIAGNOSIS — F98.8 ATTENTION DEFICIT DISORDER (ADD) WITHOUT HYPERACTIVITY: ICD-10-CM

## 2022-07-20 DIAGNOSIS — Z00.00 ROUTINE GENERAL MEDICAL EXAMINATION AT A HEALTH CARE FACILITY: Primary | ICD-10-CM

## 2022-07-20 PROCEDURE — 99395 PREV VISIT EST AGE 18-39: CPT | Performed by: FAMILY MEDICINE

## 2022-07-20 RX ORDER — METHYLPHENIDATE HYDROCHLORIDE 36 MG/1
TABLET ORAL
COMMUNITY
Start: 2022-07-15

## 2022-07-20 NOTE — PROGRESS NOTES
in acute distress. Appearance: Normal appearance. He is not ill-appearing. HENT:      Head: Normocephalic and atraumatic. Right Ear: Tympanic membrane, ear canal and external ear normal. There is no impacted cerumen. Left Ear: Tympanic membrane, ear canal and external ear normal. There is no impacted cerumen. Nose: Nose normal.      Mouth/Throat:      Mouth: Mucous membranes are moist.      Pharynx: Oropharynx is clear. No oropharyngeal exudate or posterior oropharyngeal erythema. Eyes:      General: No scleral icterus. Extraocular Movements: Extraocular movements intact. Conjunctiva/sclera: Conjunctivae normal.      Pupils: Pupils are equal, round, and reactive to light. Neck:      Vascular: No carotid bruit. Cardiovascular:      Rate and Rhythm: Normal rate and regular rhythm. Pulses: Normal pulses. Heart sounds: Normal heart sounds. No murmur heard. Pulmonary:      Effort: Pulmonary effort is normal. No respiratory distress. Breath sounds: Normal breath sounds. No wheezing or rhonchi. Abdominal:      General: Abdomen is flat. Bowel sounds are normal. There is no distension. Palpations: Abdomen is soft. There is no mass. Tenderness: There is no abdominal tenderness. There is no guarding or rebound. Hernia: No hernia is present. Musculoskeletal:         General: Normal range of motion. Cervical back: Normal range of motion and neck supple. Right lower leg: No edema. Left lower leg: No edema. Lymphadenopathy:      Cervical: No cervical adenopathy. Skin:     General: Skin is warm. Findings: No rash. Neurological:      General: No focal deficit present. Mental Status: He is alert and oriented to person, place, and time. Cranial Nerves: No cranial nerve deficit. Motor: No weakness.       Deep Tendon Reflexes: Reflexes normal.   Psychiatric:         Mood and Affect: Mood normal.         Behavior: Behavior

## 2022-07-22 ENCOUNTER — NURSE ONLY (OUTPATIENT)
Dept: FAMILY MEDICINE CLINIC | Facility: CLINIC | Age: 37
End: 2022-07-22

## 2022-07-22 DIAGNOSIS — Z11.1 ENCOUNTER FOR PPD SKIN TEST READING: Primary | ICD-10-CM

## 2022-07-22 LAB — HBV SURFACE AB SERPL IA-ACNC: >1000 MIU/ML

## 2022-07-23 LAB
MEV IGG SER IA-ACNC: >300 AU/ML
MUV IGG SER IA-ACNC: 33.8 AU/ML
VZV IGG SER IA-ACNC: 887 INDEX

## 2022-08-03 ENCOUNTER — TELEPHONE (OUTPATIENT)
Dept: FAMILY MEDICINE CLINIC | Facility: CLINIC | Age: 37
End: 2022-08-03

## 2022-08-03 NOTE — TELEPHONE ENCOUNTER
----- Message from [facility] sent at 8/2/2022 11:51 AM EDT -----  Subject: Message to Provider    QUESTIONS  Information for Provider? Patient called in to see if he is able to pick   up the paperwork that was completed when he had his physical exam. Please   contact patient to further assist.   ---------------------------------------------------------------------------  --------------  [id number]  [phone]; Do not leave any message, patient will call back for answer  ---------------------------------------------------------------------------  --------------  SCRIPT ANSWERS  Relationship to Patient?  Self

## 2022-08-08 ENCOUNTER — NURSE ONLY (OUTPATIENT)
Dept: FAMILY MEDICINE CLINIC | Facility: CLINIC | Age: 37
End: 2022-08-08

## 2022-08-08 DIAGNOSIS — Z11.1 ENCOUNTER FOR PPD SKIN TEST READING: Primary | ICD-10-CM

## 2022-08-10 ENCOUNTER — NURSE ONLY (OUTPATIENT)
Dept: FAMILY MEDICINE CLINIC | Facility: CLINIC | Age: 37
End: 2022-08-10

## 2022-08-10 DIAGNOSIS — Z11.1 ENCOUNTER FOR PPD SKIN TEST READING: Primary | ICD-10-CM

## 2022-08-10 ASSESSMENT — PATIENT HEALTH QUESTIONNAIRE - PHQ9
2. FEELING DOWN, DEPRESSED OR HOPELESS: 0
SUM OF ALL RESPONSES TO PHQ QUESTIONS 1-9: 0
SUM OF ALL RESPONSES TO PHQ QUESTIONS 1-9: 0
1. LITTLE INTEREST OR PLEASURE IN DOING THINGS: 0
SUM OF ALL RESPONSES TO PHQ9 QUESTIONS 1 & 2: 0
SUM OF ALL RESPONSES TO PHQ QUESTIONS 1-9: 0
SUM OF ALL RESPONSES TO PHQ QUESTIONS 1-9: 0

## 2022-11-23 ENCOUNTER — OFFICE VISIT (OUTPATIENT)
Dept: FAMILY MEDICINE CLINIC | Facility: CLINIC | Age: 37
End: 2022-11-23
Payer: COMMERCIAL

## 2022-11-23 VITALS
OXYGEN SATURATION: 99 % | DIASTOLIC BLOOD PRESSURE: 88 MMHG | BODY MASS INDEX: 28.17 KG/M2 | WEIGHT: 202 LBS | SYSTOLIC BLOOD PRESSURE: 140 MMHG | HEART RATE: 89 BPM

## 2022-11-23 DIAGNOSIS — R10.33 PERIUMBILICAL ABDOMINAL CRAMPING: ICD-10-CM

## 2022-11-23 DIAGNOSIS — R03.0 BLOOD PRESSURE ELEVATED WITHOUT HISTORY OF HTN: ICD-10-CM

## 2022-11-23 DIAGNOSIS — F98.8 ATTENTION DEFICIT DISORDER (ADD) WITHOUT HYPERACTIVITY: ICD-10-CM

## 2022-11-23 DIAGNOSIS — R14.3 FLATULENCE: ICD-10-CM

## 2022-11-23 DIAGNOSIS — R14.0 ABDOMINAL BLOATING: Primary | ICD-10-CM

## 2022-11-23 LAB
ALBUMIN SERPL-MCNC: 4.4 G/DL (ref 3.5–5)
ALBUMIN/GLOB SERPL: 1.3 {RATIO} (ref 0.4–1.6)
ALP SERPL-CCNC: 130 U/L (ref 50–136)
ALT SERPL-CCNC: 107 U/L (ref 12–65)
ANION GAP SERPL CALC-SCNC: 7 MMOL/L (ref 2–11)
AST SERPL-CCNC: 32 U/L (ref 15–37)
BASOPHILS # BLD: 0.1 K/UL (ref 0–0.2)
BASOPHILS NFR BLD: 1 % (ref 0–2)
BILIRUB SERPL-MCNC: 0.3 MG/DL (ref 0.2–1.1)
BUN SERPL-MCNC: 14 MG/DL (ref 6–23)
CALCIUM SERPL-MCNC: 10 MG/DL (ref 8.3–10.4)
CHLORIDE SERPL-SCNC: 103 MMOL/L (ref 101–110)
CO2 SERPL-SCNC: 28 MMOL/L (ref 21–32)
CREAT SERPL-MCNC: 1 MG/DL (ref 0.8–1.5)
DIFFERENTIAL METHOD BLD: NORMAL
EOSINOPHIL # BLD: 0.1 K/UL (ref 0–0.8)
EOSINOPHIL NFR BLD: 2 % (ref 0.5–7.8)
ERYTHROCYTE [DISTWIDTH] IN BLOOD BY AUTOMATED COUNT: 12.2 % (ref 11.9–14.6)
GLOBULIN SER CALC-MCNC: 3.4 G/DL (ref 2.8–4.5)
GLUCOSE SERPL-MCNC: 94 MG/DL (ref 65–100)
HCT VFR BLD AUTO: 45.6 % (ref 41.1–50.3)
HGB BLD-MCNC: 15.5 G/DL (ref 13.6–17.2)
IMM GRANULOCYTES # BLD AUTO: 0 K/UL (ref 0–0.5)
IMM GRANULOCYTES NFR BLD AUTO: 1 % (ref 0–5)
LIPASE SERPL-CCNC: 141 U/L (ref 73–393)
LYMPHOCYTES # BLD: 2.6 K/UL (ref 0.5–4.6)
LYMPHOCYTES NFR BLD: 40 % (ref 13–44)
MCH RBC QN AUTO: 28.7 PG (ref 26.1–32.9)
MCHC RBC AUTO-ENTMCNC: 34 G/DL (ref 31.4–35)
MCV RBC AUTO: 84.3 FL (ref 82–102)
MONOCYTES # BLD: 0.5 K/UL (ref 0.1–1.3)
MONOCYTES NFR BLD: 8 % (ref 4–12)
NEUTS SEG # BLD: 3.2 K/UL (ref 1.7–8.2)
NEUTS SEG NFR BLD: 48 % (ref 43–78)
NRBC # BLD: 0 K/UL (ref 0–0.2)
PLATELET # BLD AUTO: 230 K/UL (ref 150–450)
PMV BLD AUTO: 11.7 FL (ref 9.4–12.3)
POTASSIUM SERPL-SCNC: 4.2 MMOL/L (ref 3.5–5.1)
PROT SERPL-MCNC: 7.8 G/DL (ref 6.3–8.2)
RBC # BLD AUTO: 5.41 M/UL (ref 4.23–5.6)
SODIUM SERPL-SCNC: 138 MMOL/L (ref 133–143)
WBC # BLD AUTO: 6.5 K/UL (ref 4.3–11.1)

## 2022-11-23 PROCEDURE — 99214 OFFICE O/P EST MOD 30 MIN: CPT | Performed by: NURSE PRACTITIONER

## 2022-11-23 RX ORDER — SIMETHICONE 80 MG
80 TABLET,CHEWABLE ORAL 3 TIMES DAILY PRN
Qty: 30 TABLET | Refills: 2 | Status: SHIPPED | OUTPATIENT
Start: 2022-11-23

## 2022-11-23 RX ORDER — OMEPRAZOLE 20 MG/1
20 CAPSULE, DELAYED RELEASE ORAL
Qty: 30 CAPSULE | Refills: 2 | Status: SHIPPED | OUTPATIENT
Start: 2022-11-23

## 2022-11-23 RX ORDER — DICYCLOMINE HYDROCHLORIDE 10 MG/1
10 CAPSULE ORAL 3 TIMES DAILY PRN
Qty: 30 CAPSULE | Refills: 2 | Status: SHIPPED | OUTPATIENT
Start: 2022-11-23

## 2022-11-23 ASSESSMENT — ENCOUNTER SYMPTOMS
BLOOD IN STOOL: 0
CHEST TIGHTNESS: 0
EYE DISCHARGE: 0
RHINORRHEA: 0
ALLERGIC/IMMUNOLOGIC NEGATIVE: 1
WHEEZING: 0
RESPIRATORY NEGATIVE: 1
SINUS PRESSURE: 0
VOMITING: 0
NAUSEA: 0
COLOR CHANGE: 0
COUGH: 0
SINUS PAIN: 0
FACIAL SWELLING: 0
VOICE CHANGE: 0
SHORTNESS OF BREATH: 0
APNEA: 0
SORE THROAT: 0
RECTAL PAIN: 0
TROUBLE SWALLOWING: 0
CONSTIPATION: 0
STRIDOR: 0
EYE PAIN: 0
ANAL BLEEDING: 0
BACK PAIN: 0
DIARRHEA: 0
EYES NEGATIVE: 1
CHOKING: 0

## 2022-11-23 NOTE — PROGRESS NOTES
[address]  Phone: [phone] Fax [phone]  Mk Witt. Jess MS, APRN, FNP-C  11/23/22  Chief Complaint   Patient presents with    Abdominal Pain     Pt s/p cholecystectomy. Pt reports over past month or so having abdominal bloating and gas and periumbilical abdominal cramping 10-15 minutes after eating. Pt denies any N/V/D or constipation. Reports daily normal BM. Pt denies heartburn or reflux. Reports normal appetite. Pt denies any fevers. Pt reports going to  recently and was given Pepcid and PRN Levsin, but pt denies noticing much of a difference in his symptoms. ASSESSMENT/PLAN:  Below is the assessment and plan developed based on review of pertinent history, physical exam, labs, studies, and medications. 1. Abdominal bloating  2. Flatulence  3. Periumbilical abdominal cramping  Discussed with pt. Symptoms c/w possible IBS vs silent GERD vs exocrine pancreatic insufficiency vs other unknown etiology. Will check CBC, CMP, Lipase to further evaluate. Will have pt keep a food diary and avoid trigger foods. Will have pt start OTC Probiotic. Will have pt stop Pepcid and PRN Levsin and give Omeprazole 20 mg po daily, PRN Bentyl, and PRN Simethicone as directed to see if symptoms improve. Urgent referral given to GI for further evaluation. Pt may need EGD/colonoscopy. Pt to f/u in 2-3 weeks with his PCP-Dr. Je Abdul. Agrees to call sooner for concerns/new or worsening symptoms. Pt agrees to go to ER for severe symptoms as discussed. - CBC with Auto Differential; Future  - Comprehensive Metabolic Panel; Future  - Lipase; Future  - omeprazole (PRILOSEC) 20 MG delayed release capsule; Take 1 capsule by mouth every morning (before breakfast)  Dispense: 30 capsule; Refill: 2  - dicyclomine (BENTYL) 10 MG capsule; Take 1 capsule by mouth 3 times daily as needed (periumbilical cramping/bloating.)  Dispense: 30 capsule;  Refill: 2  - [facility] - Juan Griffith MD, Gastroenterology, [facility]) 80 MG chewable tablet; Take 1 tablet by mouth 3 times daily as needed for Flatulence  Dispense: 30 tablet; Refill: 7e - 03495 - Venipuncture    4. Blood pressure elevated without history of HTN  Pt's BP minimally elevated in office today at 140/88 (goal <140/90). Pt has no prior hx of HTN, but is on Concerta for ADD prescribed by his PCP-Dr. Delta Dutton. Discussed with pt. Will have pt watch his sodium/caffeine intake. Pt agrees to monitor his BP closely. Pt to f/u with his PCP-Dr. Delta Dutton in 2-3 weeks to recheck BP. If BP still elevated, Dr. Delta Dutton may want to evaluate further to see if ADD medication is contributing. 5. 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 Return in about 2 weeks (around 2022) for F/u 2-3 weeks with Dr. Delta Dutton to recheck GI complaints. Call sooner if worse. ER severe symptoms. SUBJECTIVE/OBJECTIVE:    HPI-  Nader Dumont. (: 1985) is a 30 y.o. male, Established patient patient, here for evaluation of the following chief complaint(s):  Abdominal Pain (Pt s/p cholecystectomy. Pt reports over past month or so having abdominal bloating and gas and periumbilical abdominal cramping 10-15 minutes after eating. Pt denies any N/V/D or constipation. Reports daily normal BM. Pt denies heartburn or reflux. Reports normal appetite. Pt denies any fevers.  Pt reports going to  recently and was given Pepcid and PRN Levsin, but pt denies noticing much of a difference in his symptoms. )     Allergies   Allergen Reactions    Latex     Covid-19 (Mrna) Vaccine Nausea And Vomiting    Penicillins Rash     [unfilled]  Past Medical History:   Diagnosis Date    ADD (attention deficit disorder)     Anxiety      Past Surgical History:   Procedure Laterality Date    CHOLECYSTECTOMY      LAP,CHOLECYSTECTOMY       Family History   Problem Relation Age of Onset    High Cholesterol Father Hypertension Father      Social History     Tobacco Use   Smoking Status Never   Smokeless Tobacco Never         Review of Systems   Constitutional: Negative. Negative for appetite change, chills, diaphoresis, fatigue, fever and unexpected weight change. HENT: Negative. Negative for congestion, dental problem, drooling, ear discharge, ear pain, facial swelling, hearing loss, mouth sores, nosebleeds, postnasal drip, rhinorrhea, sinus pressure, sinus pain, sneezing, sore throat, tinnitus, trouble swallowing and voice change. Eyes: Negative. Negative for pain, discharge and visual disturbance. Respiratory: Negative. Negative for apnea, cough, choking, chest tightness, shortness of breath, wheezing and stridor. Cardiovascular: Negative. Negative for chest pain, palpitations and leg swelling. Gastrointestinal:  Positive for abdominal distention (bloating/gas) and abdominal pain (periumbilical abdominal cramping). Negative for anal bleeding, blood in stool, constipation, diarrhea, nausea, rectal pain and vomiting. Pt s/p cholecystectomy. Pt reports over past month or so having abdominal bloating and gas and periumbilical abdominal cramping 10-15 minutes after eating. Pt denies any N/V/D or constipation. Reports daily normal BM. Pt denies heartburn or reflux. Reports normal appetite. Pt denies any fevers. Pt reports going to  recently and was given Pepcid and PRN Levsin, but pt denies noticing much of a difference in his symptoms. Endocrine: Negative. Negative for cold intolerance, heat intolerance, polydipsia, polyphagia and polyuria. Genitourinary: Negative. Negative for decreased urine volume, difficulty urinating, dysuria, flank pain, frequency, genital sores, hematuria, penile discharge, penile pain, penile swelling, scrotal swelling, testicular pain and urgency. Musculoskeletal: Negative.   Negative for arthralgias, back pain, gait problem, joint swelling, myalgias, neck pain and neck stiffness. Skin: Negative. Negative for color change, pallor, rash and wound. Allergic/Immunologic: Negative. Negative for environmental allergies. Neurological: Negative. Negative for dizziness, tremors, seizures, syncope, facial asymmetry, speech difficulty, weakness, light-headedness, numbness and headaches. Hematological: Negative. Negative for adenopathy. Does not bruise/bleed easily. Vitals:    11/23/22 1106   BP: (!) 140/88   Pulse: 89   SpO2: 99%       Physical Exam  Vitals reviewed. Constitutional:       General: He is not in acute distress. Appearance: Normal appearance. He is normal weight. He is not ill-appearing, toxic-appearing or diaphoretic. HENT:      Head: Normocephalic and atraumatic. Right Ear: Tympanic membrane, ear canal and external ear normal. There is no impacted cerumen. Left Ear: Tympanic membrane, ear canal and external ear normal. There is no impacted cerumen. Nose: Nose normal. No congestion or rhinorrhea. Mouth/Throat:      Mouth: Mucous membranes are moist.      Pharynx: Oropharynx is clear. No oropharyngeal exudate or posterior oropharyngeal erythema. Eyes:      General: No scleral icterus. Right eye: No discharge. Left eye: No discharge. Extraocular Movements: Extraocular movements intact. Conjunctiva/sclera: Conjunctivae normal.      Pupils: Pupils are equal, round, and reactive to light. Cardiovascular:      Rate and Rhythm: Normal rate and regular rhythm. Pulses: Normal pulses. Heart sounds: Normal heart sounds. No murmur heard. No friction rub. No gallop. Pulmonary:      Effort: Pulmonary effort is normal. No respiratory distress. Breath sounds: Normal breath sounds. No stridor. No wheezing, rhonchi or rales. Chest:      Chest wall: No tenderness. Abdominal:      General: Abdomen is flat. Bowel sounds are normal. There is no distension. Palpations: Abdomen is soft.  There is no mass.      Tenderness: There is abdominal tenderness (mild generalized periumbilical tenderness noted but no guarding or rebound. No ttp to RUQ/LUQ or LLQ/RLQ. ). There is no right CVA tenderness, left CVA tenderness, guarding or rebound. Hernia: No hernia is present. Musculoskeletal:         General: No swelling, tenderness, deformity or signs of injury. Normal range of motion. Cervical back: Normal range of motion and neck supple. No rigidity or tenderness. Right lower leg: No edema. Left lower leg: No edema. Comments: Gait steady and unassisted   Lymphadenopathy:      Cervical: No cervical adenopathy. Skin:     General: Skin is warm. Capillary Refill: Capillary refill takes less than 2 seconds. Coloration: Skin is not jaundiced or pale. Findings: No bruising, erythema, lesion or rash. Neurological:      General: No focal deficit present. Mental Status: He is alert and oriented to person, place, and time. Cranial Nerves: No cranial nerve deficit. Sensory: No sensory deficit. Motor: No weakness. Coordination: Coordination normal.      Gait: Gait normal.   Psychiatric:         Mood and Affect: Mood normal.         Behavior: Behavior normal.         Thought Content:  Thought content normal.         Judgment: Judgment normal.     Component      Latest Ref Rng & Units 7/13/2022 7/13/2022 7/13/2022           3:43 PM  3:43 PM  3:43 PM   WBC      4.3 - 11.1 K/uL   6.5   RBC      4.23 - 5.6 M/uL   5.44   Hemoglobin Quant      13.6 - 17.2 g/dL   15.1   Hematocrit      41.1 - 50.3 %   45.7   MCV      79.6 - 97.8 FL   84.0   MCH      26.1 - 32.9 PG   27.8   MCHC      31.4 - 35.0 g/dL   33.0   RDW      11.9 - 14.6 %   12.7   Platelet Count      530 - 450 K/uL   238   MPV      9.4 - 12.3 FL   12.1   Nucleated Red Blood Cells      0.0 - 0.2 K/uL   0.00   Differential Type         AUTOMATED   Seg Neutrophils      43 - 78 %   54   Lymphocytes      13 - 44 %   35 Monocytes      4.0 - 12.0 %   7   Eosinophils %      0.5 - 7.8 %   3   Basophils      0.0 - 2.0 %   1   Immature Granulocytes      0.0 - 5.0 %   0   Segs Absolute      1.7 - 8.2 K/UL   3.5   Absolute Lymph #      0.5 - 4.6 K/UL   2.3   Absolute Mono #      0.1 - 1.3 K/UL   0.4   Absolute Eos #      0.0 - 0.8 K/UL   0.2   Basophils Absolute      0.0 - 0.2 K/UL   0.1   Absolute Immature Granulocyte      0.0 - 0.5 K/UL   0.0   Sodium      136 - 145 mmol/L  140    Potassium      3.5 - 5.1 mmol/L  4.2    Chloride      98 - 107 mmol/L  108 (H)    CO2      21 - 32 mmol/L  22    Anion Gap      7 - 16 mmol/L  10    Glucose, Random      65 - 100 mg/dL  126 (H)    BUN,BUNPL      6 - 23 MG/DL  11    Creatinine      0.8 - 1.5 MG/DL  1.00    GFR African American      >60 ml/min/1.73m2  >60    GFR Non-African American      >60 ml/min/1.73m2  >60    CALCIUM, SERUM, 514018      8.3 - 10.4 MG/DL  9.6    Bilirubin      0.2 - 1.1 MG/DL  0.3    ALT      12 - 65 U/L  32    AST      15 - 37 U/L  17    Alk Phosphatase      50 - 136 U/L  144 (H)    Total Protein      6.3 - 8.2 g/dL  7.2    Albumin      3.5 - 5.0 g/dL  4.1    Globulin      2.3 - 3.5 g/dL  3.1    ALBUMIN/GLOBULIN RATIO      1.2 - 3.5    1.3    TSH, 3RD GENERATION      0.358 - 3.740 uIU/mL 0.839       PLEASE NOTE:  This document has been produced using voice recognition software. Unrecognized errors in transcription may be present. On this date 11/23/2022 I have spent 30 minutes reviewing previous notes, test results and face to face with the patient discussing the diagnosis and importance of compliance with the treatment plan as well as documenting on the day of the visit. An electronic signature was used to authenticate this note.   -- CHETAN Melgar - NP

## 2022-11-25 DIAGNOSIS — R14.3 FLATULENCE: ICD-10-CM

## 2022-11-25 DIAGNOSIS — R10.33 PERIUMBILICAL ABDOMINAL CRAMPING: ICD-10-CM

## 2022-11-25 DIAGNOSIS — R14.0 ABDOMINAL BLOATING: ICD-10-CM

## 2022-11-25 DIAGNOSIS — R74.01 ELEVATED ALT MEASUREMENT: Primary | ICD-10-CM

## 2022-11-25 ASSESSMENT — ENCOUNTER SYMPTOMS
ABDOMINAL PAIN: 1
ABDOMINAL DISTENTION: 1

## 2022-11-30 ENCOUNTER — OFFICE VISIT (OUTPATIENT)
Dept: FAMILY MEDICINE CLINIC | Facility: CLINIC | Age: 37
End: 2022-11-30
Payer: COMMERCIAL

## 2022-11-30 VITALS
DIASTOLIC BLOOD PRESSURE: 82 MMHG | HEIGHT: 71 IN | WEIGHT: 211.8 LBS | SYSTOLIC BLOOD PRESSURE: 124 MMHG | BODY MASS INDEX: 29.65 KG/M2

## 2022-11-30 DIAGNOSIS — F98.8 ATTENTION DEFICIT DISORDER (ADD) WITHOUT HYPERACTIVITY: ICD-10-CM

## 2022-11-30 DIAGNOSIS — F41.9 ANXIETY: ICD-10-CM

## 2022-11-30 DIAGNOSIS — L21.9 SEBORRHEA: ICD-10-CM

## 2022-11-30 DIAGNOSIS — R79.89 ELEVATED LFTS: ICD-10-CM

## 2022-11-30 DIAGNOSIS — R10.33 PERIUMBILICAL ABDOMINAL PAIN: ICD-10-CM

## 2022-11-30 DIAGNOSIS — R21 RASH: Primary | ICD-10-CM

## 2022-11-30 PROCEDURE — 99214 OFFICE O/P EST MOD 30 MIN: CPT | Performed by: FAMILY MEDICINE

## 2022-11-30 RX ORDER — KETOCONAZOLE 20 MG/ML
SHAMPOO TOPICAL
Qty: 1 EACH | Refills: 3 | Status: SHIPPED | OUTPATIENT
Start: 2022-11-30

## 2022-11-30 ASSESSMENT — PATIENT HEALTH QUESTIONNAIRE - PHQ9
1. LITTLE INTEREST OR PLEASURE IN DOING THINGS: 0
SUM OF ALL RESPONSES TO PHQ QUESTIONS 1-9: 0
SUM OF ALL RESPONSES TO PHQ QUESTIONS 1-9: 0
2. FEELING DOWN, DEPRESSED OR HOPELESS: 0
SUM OF ALL RESPONSES TO PHQ QUESTIONS 1-9: 0
SUM OF ALL RESPONSES TO PHQ9 QUESTIONS 1 & 2: 0
SUM OF ALL RESPONSES TO PHQ QUESTIONS 1-9: 0

## 2022-11-30 NOTE — PROGRESS NOTES
SUBJECTIVE:   Alphonso Ascencio. is a 30 y.o. male presents today reporting that he has a irritating rash on his face forehead and scalp. This rash will flake and become very dry and turn red. Stress and heat makes it worse. It is not affected by hot liquids. He is reported no new soaps, detergents, cosmetics etc.  This rash has been present for \"years\". In addition patient has been experiencing periumbilical and right upper quadrant abdominal discomfort. He is status postcholecystectomy. He saw the nurse practitioner who ordered a metabolic panel, CBC and lipase. Metabolic panel showed an increase in ALT at 107. Otherwise unremarkable. Lipase and CBC unremarkable. An ultrasound has been ordered. He was placed on Prilosec and Bentyl and told to keep a food diary. Symptoms have abated some but have not completely resolved. Patient reports no melena or hematochezia. 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 HPI  See above    Past Medical History, Past Surgical History, Family history, Social History, and Medications were all reviewed with the patient today and updated as necessary. Current Outpatient Medications   Medication Sig Dispense Refill    omeprazole (PRILOSEC) 20 MG delayed release capsule Take 1 capsule by mouth every morning (before breakfast) 30 capsule 2    dicyclomine (BENTYL) 10 MG capsule Take 1 capsule by mouth 3 times daily as needed (periumbilical cramping/bloating.) 30 capsule 2    simethicone (MYLICON) 80 MG chewable tablet Take 1 tablet by mouth 3 times daily as needed for Flatulence 30 tablet 2    methylphenidate (CONCERTA) 36 MG extended release tablet TAKE 1 TABLET BY MOUTH EVERY MORNING AND 1 TABLET EVERY EVENING      albuterol sulfate  (90 Base) MCG/ACT inhaler Inhale 2 puffs into the lungs every 6 hours as needed      ketoconazole (NIZORAL) 2 % shampoo Apply topically daily as needed.  1 each 3     No current facility-administered medications for this visit. Allergies   Allergen Reactions    Latex     Covid-19 (Mrna) Vaccine Nausea And Vomiting     NOT ALLERGIC WAS ABLE TO GET SECOND DOSE    Penicillins Rash     Patient Active Problem List   Diagnosis    ADD (attention deficit disorder)    Anxiety     Past Medical History:   Diagnosis Date    ADD (attention deficit disorder)     Anxiety      Past Surgical History:   Procedure Laterality Date    CHOLECYSTECTOMY      LAP,CHOLECYSTECTOMY       Family History   Problem Relation Age of Onset    High Cholesterol Father     Hypertension Father      Social History     Tobacco Use    Smoking status: Never    Smokeless tobacco: Never   Substance Use Topics    Alcohol use: Yes         Review of Systems  See above    OBJECTIVE:  /82   Ht 5' 11\" (1.803 m)   Wt 211 lb 12.8 oz (96.1 kg)   BMI 29.54 kg/m²      Physical Exam  Constitutional:       General: He is not in acute distress. Appearance: Normal appearance. He is not ill-appearing. HENT:      Head: Normocephalic and atraumatic. Cardiovascular:      Rate and Rhythm: Normal rate and regular rhythm. Heart sounds: Normal heart sounds. No murmur heard. Pulmonary:      Effort: Pulmonary effort is normal.      Breath sounds: Normal breath sounds. No wheezing or rhonchi. Musculoskeletal:         General: Normal range of motion. Cervical back: Normal range of motion and neck supple. Right lower leg: No edema. Left lower leg: No edema. Skin:     General: Skin is warm. Findings: Rash present. Comments: Patient has an erythematous rash on his face and forehead consistent with seborrheic. Neurological:      Mental Status: He is alert and oriented to person, place, and time. Psychiatric:         Mood and Affect: Mood normal.         Behavior: Behavior normal.         Thought Content:  Thought content normal.         Judgment: Judgment normal.       Medical problems and test results were reviewed with the patient today. ASSESSMENT and PLAN    1.  Rash. Appears to be seborrhea. 2.  Seborrhea. Trial of Nizoral shampoo 3 times a week with instructions to lather and let sit on his face and forehead and scalp for 3 to 5 minutes and then rinse. On the other 2 days he will use a small amount of cortisone 10 over-the-counter cream.  Reevaluate at follow-up. 3.  Periumbilical abdominal pain. Possible stone in common bile duct. Ultrasound pending. Reevaluate at scheduled follow-up. Sooner if worsens. Reevaluate liver enzymes also at follow-up. 4.  Elevated LFTs. As above. 5.  ADD. Per psychiatry. No complaints. 6.  Anxiety. Per psychiatry. Currently on no specific medicine. Denies any breakthrough symptoms. Elements of this note have been dictated using speech recognition software. As a result, errors of speech recognition may have occurred.

## 2022-12-06 ENCOUNTER — HOSPITAL ENCOUNTER (OUTPATIENT)
Dept: ULTRASOUND IMAGING | Age: 37
Discharge: HOME | End: 2022-12-09

## 2022-12-06 DIAGNOSIS — R10.33 PERIUMBILICAL ABDOMINAL CRAMPING: ICD-10-CM

## 2022-12-06 DIAGNOSIS — R14.3 FLATULENCE: ICD-10-CM

## 2022-12-06 DIAGNOSIS — R74.01 ELEVATED ALT MEASUREMENT: ICD-10-CM

## 2022-12-06 DIAGNOSIS — R14.0 ABDOMINAL BLOATING: ICD-10-CM

## 2022-12-14 ENCOUNTER — OFFICE VISIT (OUTPATIENT)
Dept: FAMILY MEDICINE CLINIC | Facility: CLINIC | Age: 37
End: 2022-12-14
Payer: COMMERCIAL

## 2022-12-14 VITALS
HEART RATE: 82 BPM | OXYGEN SATURATION: 98 % | WEIGHT: 214 LBS | SYSTOLIC BLOOD PRESSURE: 126 MMHG | HEIGHT: 71 IN | DIASTOLIC BLOOD PRESSURE: 72 MMHG | TEMPERATURE: 98.6 F | BODY MASS INDEX: 29.96 KG/M2

## 2022-12-14 DIAGNOSIS — F41.9 ANXIETY: ICD-10-CM

## 2022-12-14 DIAGNOSIS — R21 RASH: ICD-10-CM

## 2022-12-14 DIAGNOSIS — R79.89 ELEVATED LFTS: ICD-10-CM

## 2022-12-14 DIAGNOSIS — R10.11 RIGHT UPPER QUADRANT ABDOMINAL PAIN: Primary | ICD-10-CM

## 2022-12-14 DIAGNOSIS — F98.8 ATTENTION DEFICIT DISORDER (ADD) WITHOUT HYPERACTIVITY: ICD-10-CM

## 2022-12-14 PROCEDURE — 99214 OFFICE O/P EST MOD 30 MIN: CPT | Performed by: FAMILY MEDICINE

## 2022-12-14 NOTE — PROGRESS NOTES
every 6 hours as needed       No current facility-administered medications for this visit. Allergies   Allergen Reactions    Latex     Covid-19 (Mrna) Vaccine Nausea And Vomiting     NOT ALLERGIC WAS ABLE TO GET SECOND DOSE    Penicillins Rash     Patient Active Problem List   Diagnosis    ADD (attention deficit disorder)    Anxiety     Past Medical History:   Diagnosis Date    ADD (attention deficit disorder)     Anxiety      Past Surgical History:   Procedure Laterality Date    CHOLECYSTECTOMY      LAP,CHOLECYSTECTOMY       Family History   Problem Relation Age of Onset    High Cholesterol Father     Hypertension Father      Social History     Tobacco Use    Smoking status: Never    Smokeless tobacco: Never   Substance Use Topics    Alcohol use: Yes         Review of Systems  See above    OBJECTIVE:  /72   Pulse 82   Temp 98.6 °F (37 °C) (Oral)   Ht 5' 11\" (1.803 m)   Wt 214 lb (97.1 kg)   SpO2 98%   BMI 29.85 kg/m²      Physical Exam  Constitutional:       General: He is not in acute distress. Appearance: Normal appearance. He is not ill-appearing. HENT:      Head: Normocephalic and atraumatic. Cardiovascular:      Rate and Rhythm: Normal rate and regular rhythm. Heart sounds: Normal heart sounds. No murmur heard. Pulmonary:      Effort: Pulmonary effort is normal.      Breath sounds: Normal breath sounds. No wheezing or rhonchi. Abdominal:      Palpations: Abdomen is soft. There is no mass. Tenderness: There is no abdominal tenderness. There is no rebound. Musculoskeletal:         General: Normal range of motion. Cervical back: Normal range of motion and neck supple. Right lower leg: No edema. Left lower leg: No edema. Skin:     General: Skin is warm. Findings: No rash. Neurological:      Mental Status: He is alert and oriented to person, place, and time.    Psychiatric:         Mood and Affect: Mood normal.         Behavior: Behavior normal. Thought Content: Thought content normal.         Judgment: Judgment normal.       Medical problems and test results were reviewed with the patient today. ASSESSMENT and PLAN    1. Right upper quadrant abdominal pain. Physical exam unremarkable. Ultrasound unremarkable. Labs unremarkable except for slight increase in ALT. Has follow-up with GI.    2.  Elevated LFTs. Rechecking today. Ultrasound negative for stones in the common bile duct. Checking acute hepatitis panel. 3.  Rash. Seborrhea. Much improved with Nizoral shampoo. 4.  Anxiety. Continue follow-up with psychiatry. Patient ports doing well. 5.  ADD. Per psychiatry. No complaints. Elements of this note have been dictated using speech recognition software. As a result, errors of speech recognition may have occurred.

## 2022-12-15 LAB
ALBUMIN SERPL-MCNC: 4.3 G/DL (ref 3.5–5)
ALBUMIN/GLOB SERPL: 1.3 {RATIO} (ref 0.4–1.6)
ALP SERPL-CCNC: 118 U/L (ref 50–136)
ALT SERPL-CCNC: 50 U/L (ref 12–65)
ANION GAP SERPL CALC-SCNC: 7 MMOL/L (ref 2–11)
AST SERPL-CCNC: 20 U/L (ref 15–37)
BILIRUB SERPL-MCNC: 0.3 MG/DL (ref 0.2–1.1)
BUN SERPL-MCNC: 14 MG/DL (ref 6–23)
CALCIUM SERPL-MCNC: 9.7 MG/DL (ref 8.3–10.4)
CHLORIDE SERPL-SCNC: 105 MMOL/L (ref 101–110)
CO2 SERPL-SCNC: 28 MMOL/L (ref 21–32)
CREAT SERPL-MCNC: 1 MG/DL (ref 0.8–1.5)
GLOBULIN SER CALC-MCNC: 3.4 G/DL (ref 2.8–4.5)
GLUCOSE SERPL-MCNC: 95 MG/DL (ref 65–100)
HAV IGM SER QL: NONREACTIVE
HBV CORE IGM SER QL: NONREACTIVE
HBV SURFACE AG SER QL: NONREACTIVE
HCV AB SER QL: NONREACTIVE
POTASSIUM SERPL-SCNC: 3.9 MMOL/L (ref 3.5–5.1)
PROT SERPL-MCNC: 7.7 G/DL (ref 6.3–8.2)
SODIUM SERPL-SCNC: 140 MMOL/L (ref 133–143)

## 2022-12-19 DIAGNOSIS — R14.0 ABDOMINAL BLOATING: ICD-10-CM

## 2022-12-19 DIAGNOSIS — R10.33 PERIUMBILICAL ABDOMINAL CRAMPING: ICD-10-CM

## 2022-12-19 DIAGNOSIS — R14.3 FLATULENCE: ICD-10-CM

## 2022-12-19 RX ORDER — OMEPRAZOLE 20 MG/1
20 CAPSULE, DELAYED RELEASE ORAL
Qty: 90 CAPSULE | Refills: 1 | Status: SHIPPED | OUTPATIENT
Start: 2022-12-19

## 2023-01-04 ENCOUNTER — TELEMEDICINE (OUTPATIENT)
Dept: FAMILY MEDICINE CLINIC | Facility: CLINIC | Age: 38
End: 2023-01-04
Payer: COMMERCIAL

## 2023-01-04 DIAGNOSIS — J01.90 ACUTE BACTERIAL SINUSITIS: Primary | ICD-10-CM

## 2023-01-04 DIAGNOSIS — H69.83 DYSFUNCTION OF EUSTACHIAN TUBE, BILATERAL: ICD-10-CM

## 2023-01-04 DIAGNOSIS — J34.89 SINUS PRESSURE: ICD-10-CM

## 2023-01-04 DIAGNOSIS — B96.89 ACUTE BACTERIAL SINUSITIS: Primary | ICD-10-CM

## 2023-01-04 PROCEDURE — 99214 OFFICE O/P EST MOD 30 MIN: CPT | Performed by: NURSE PRACTITIONER

## 2023-01-04 RX ORDER — AZITHROMYCIN 250 MG/1
TABLET, FILM COATED ORAL
Qty: 6 TABLET | Refills: 0 | Status: SHIPPED | OUTPATIENT
Start: 2023-01-04

## 2023-01-04 RX ORDER — PREDNISONE 20 MG/1
TABLET ORAL
Qty: 11 TABLET | Refills: 0 | Status: SHIPPED | OUTPATIENT
Start: 2023-01-04

## 2023-01-04 ASSESSMENT — ENCOUNTER SYMPTOMS
CHEST TIGHTNESS: 1
EYES NEGATIVE: 1
ABDOMINAL PAIN: 0
CONSTIPATION: 0
VOICE CHANGE: 0
SINUS PRESSURE: 1
COUGH: 1
PHOTOPHOBIA: 0
FACIAL SWELLING: 0
NAUSEA: 0
SHORTNESS OF BREATH: 0
GASTROINTESTINAL NEGATIVE: 1
SINUS PAIN: 1
EYE REDNESS: 0
EYE ITCHING: 0
EYE PAIN: 0
BACK PAIN: 0
STRIDOR: 0
RECTAL PAIN: 0
APNEA: 0
TROUBLE SWALLOWING: 0
WHEEZING: 1
VOMITING: 0
DIARRHEA: 0
RHINORRHEA: 1
CHOKING: 0
ALLERGIC/IMMUNOLOGIC NEGATIVE: 1
COLOR CHANGE: 0
BLOOD IN STOOL: 0
ANAL BLEEDING: 0
SORE THROAT: 1
EYE DISCHARGE: 0
ABDOMINAL DISTENTION: 0

## 2023-01-04 NOTE — PROGRESS NOTES
PROGRESS NOTE        Consent: This patient and/or their healthcare decision maker is aware that this patient-initiated Telehealth encounter is a billable service, with coverage as determined by their insurance carrier. Patient is aware that they may receive a bill and has provided verbal consent to proceed: Yes    I was in the officewhile conducting this encounter. Patient was at home. This virtual visit was conducted via [employer]. Pursuant to the emergency declaration under the [facility] waiver authority and the [employer] Act, this Virtual  Visit was conducted to reduce the patient's risk of exposure to COVID-19 and provide continuity of care for an established patient. Services were provided through a video synchronous discussion virtually to substitute for in-person clinic visit. Due to this being a TeleHealth evaluation, many elements of the physical examination are unable to be assessed. On this date 1/4/2023 I have spent 30 minutes reviewing previous notes, test results and face to face (virtual) with the patient discussing the diagnosis and importance of compliance with the treatment plan as well as documenting on the day of the visit. . Greater than 50% of this visit was spent counseling the patient about test results, prognosis, importance of compliance, education about disease process, benefits of medications, instructions for management of acute symptoms, and follow up plans. Chief Complaint   Patient presents with    URI     Onset 12/28 with dry cough, nasal congestion, HA, muscle pain, runny nose, chills, ear congestion and pain, post nasal drip, SOB, Sore throat, wheezing. States that he has had 2 Covid vaccines.        SUBJECTIVE:     Don Certain. is a very pleasant  30 y.o. male , with past medical history significant for GERD, anxiety and attention deficit disorder-currently following psychiatry, seen virtually regarding complaints of sinus pressure, congestion, drainage that greenish and blood tinged. Pt reports symptoms started about 1 week ago. He started Mucinex, [place] cold/sinus and Nasacort with minimal relief.  He reports that symptoms did improve but worsened in intensity.  Pertinent negatives include no fever, no chest pain, no shortness of breath, no palpitation, no unilateral or focal weakness, no abdominal pain, no nausea, no vomiting and no diarrhea.    Past Medical History, Past Surgical History, Family history, Social History, and Medications were all reviewed with the patient today and updated as necessary.       Current Outpatient Medications   Medication Sig Dispense Refill    azithromycin (ZITHROMAX) 250 MG tablet Take 2 Tablets with food by mouth first day, then 1 tab with food by mouth daily for days 2 through 5. 6 tablet 0    predniSONE (DELTASONE) 20 MG tablet Take 2 tablets with food by mouth every morning for 4 days, then 1 tablet with food by mouth every morning for 3 days then stop. 11 tablet 0    COVID-19 At Home Antigen Test KIT 1 each by In Vitro route once for 1 dose 1 kit 0    omeprazole (PRILOSEC) 20 MG delayed release capsule Take 1 capsule by mouth every morning (before breakfast) 90 capsule 1    ketoconazole (NIZORAL) 2 % shampoo Apply topically daily as needed. 1 each 3    dicyclomine (BENTYL) 10 MG capsule Take 1 capsule by mouth 3 times daily as needed (periumbilical cramping/bloating.) 30 capsule 2    simethicone (MYLICON) 80 MG chewable tablet Take 1 tablet by mouth 3 times daily as needed for Flatulence 30 tablet 2    methylphenidate (CONCERTA) 36 MG extended release tablet TAKE 1 TABLET BY MOUTH EVERY MORNING AND 1 TABLET EVERY EVENING      albuterol sulfate  (90 Base) MCG/ACT inhaler Inhale 2 puffs into the lungs every 6 hours as needed       No current facility-administered medications for this visit.  Allergies   Allergen Reactions    Latex     Covid-19 (Mrna) Vaccine Nausea And Vomiting     NOT ALLERGIC WAS ABLE TO GET SECOND DOSE    Penicillins Rash     Patient Active Problem List   Diagnosis    ADD (attention deficit disorder)    Anxiety     Past Medical History:   Diagnosis Date    ADD (attention deficit disorder)     Anxiety      Past Surgical History:   Procedure Laterality Date    CHOLECYSTECTOMY      LAP,CHOLECYSTECTOMY       Family History   Problem Relation Age of Onset    High Cholesterol Father     Hypertension Father      Social History     Tobacco Use    Smoking status: Never    Smokeless tobacco: Never   Substance Use Topics    Alcohol use: Yes         Review of Systems   Constitutional:  Positive for chills and fatigue. Negative for activity change, appetite change, diaphoresis, fever and unexpected weight change. HENT:  Positive for congestion, ear pain (Bilateral ear fullness), rhinorrhea, sinus pressure, sinus pain and sore throat. Negative for dental problem, drooling, ear discharge, facial swelling, hearing loss, mouth sores, nosebleeds, postnasal drip, sneezing, tinnitus, trouble swallowing and voice change. Eyes: Negative. Negative for photophobia, pain, discharge, redness, itching and visual disturbance. Respiratory:  Positive for cough, chest tightness and wheezing. Negative for apnea, choking, shortness of breath and stridor. Cardiovascular: Negative. Negative for chest pain, palpitations and leg swelling. Gastrointestinal: Negative. Negative for abdominal distention, abdominal pain, anal bleeding, blood in stool, constipation, diarrhea, nausea, rectal pain and vomiting. Endocrine: Negative. Negative for cold intolerance, heat intolerance, polydipsia, polyphagia and polyuria. Genitourinary: Negative. Negative for decreased urine volume, difficulty urinating, dysuria, enuresis, flank pain, frequency, genital sores, hematuria and urgency.    Musculoskeletal: Negative. Negative for arthralgias, back pain, gait problem, joint swelling, myalgias, neck pain and neck stiffness. Skin: Negative. Negative for color change, pallor, rash and wound. Allergic/Immunologic: Negative. Negative for environmental allergies, food allergies and immunocompromised state. Neurological: Negative. Negative for dizziness, tremors, seizures, syncope, facial asymmetry, speech difficulty, weakness, light-headedness, numbness and headaches. Hematological: Negative. Negative for adenopathy. Does not bruise/bleed easily. Psychiatric/Behavioral: Negative. Negative for agitation, behavioral problems, confusion, decreased concentration, dysphoric mood, hallucinations, self-injury, sleep disturbance and suicidal ideas. The patient is not nervous/anxious and is not hyperactive. OBJECTIVE:    Patient's vital signs were not performed as encounter was performed virtually. Location of virtual visit was patient's home. Physical Exam  Constitutional:       General: He is not in acute distress. Appearance: Normal appearance. He is not ill-appearing, toxic-appearing or diaphoretic. HENT:      Head: Normocephalic and atraumatic. Pulmonary:      Comments: Due to limitation nature of virtual visit, I was unable to auscultate patient. No audible wheezing noted via video. Neurological:      General: No focal deficit present. Mental Status: He is alert and oriented to person, place, and time. Psychiatric:         Mood and Affect: Mood normal.         Behavior: Behavior normal.         Thought Content: Thought content normal.         Judgment: Judgment normal.        Medical problems and test results were reviewed with the patient today. ASSESSMENT and PLAN    1. Acute bacterial sinusitis  -     azithromycin (ZITHROMAX) 250 MG tablet;  Take 2 Tablets with food by mouth first day, then 1 tab with food by mouth daily for days 2 through 5., Disp-6 tablet, R-0Normal  - predniSONE (DELTASONE) 20 MG tablet; Take 2 tablets with food by mouth every morning for 4 days, then 1 tablet with food by mouth every morning for 3 days then stop., Disp-11 tablet, R-0Normal    As symptom has been present for about a week, unlikely viral at this time. We will proceed to treat for bacterial sinusitis. Start Z-[place]. Reviewed risk and side effects of medication including GI upset and increased risk for opportunistic infection such as yeast and C. difficile. Patient was advised to take medication with food and to increase probiotic supplementation (via yogurt, cottage cheese, cultured foods/drinks or OTC probiotic supplements) to avoid any opportunistic infection. Advised patient to continue with Mucinex and to restart Nasacort for prevention when he started experiencing reoccurring symptoms. We will also proceed to treat with a course of prednisone. Reviewed risks and side effects of medication including increased GI upset, increased risks for cardiac disease, flushing, hyperactivity, irritability, agitation, insomnia. Patient was advised to take medication with food and to take early in the day. Pt was also advised to stay hydrated. Advised patient let us know if symptoms do not improve or resolve. He is to go to the ED with any chest pain or shortness of breath. 2. 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 3. Dysfunction of Eustachian tube, bilateral  -     predniSONE (DELTASONE) 20 MG tablet; Take 2 tablets with food by mouth every morning for 4 days, then 1 tablet with food by mouth every morning for 3 days then stop., Disp-11 tablet, R-0Normal    Patient with bilateral ear pressure and sinus reoccurring pressure.   Will treat with steroid course. Advised pt to let us know if symptoms do not improve or resolve. Elements of this note have been dictated using speech recognition software. As a result, errors of speech recognition may have occurred. Return if symptoms worsen or fail to improve.      Alicia Lozada, APRN - CNP

## 2023-01-05 RX ORDER — ALBUTEROL SULFATE 90 UG/1
2 AEROSOL, METERED RESPIRATORY (INHALATION) EVERY 6 HOURS PRN
Qty: 18 G | Refills: 5 | Status: SHIPPED | OUTPATIENT
Start: 2023-01-05

## 2023-02-06 ENCOUNTER — NURSE ONLY (OUTPATIENT)
Dept: FAMILY MEDICINE CLINIC | Facility: CLINIC | Age: 38
End: 2023-02-06
Payer: COMMERCIAL

## 2023-02-06 DIAGNOSIS — E78.1 PURE HYPERGLYCERIDEMIA: Primary | ICD-10-CM

## 2023-02-06 PROCEDURE — 36415 COLL VENOUS BLD VENIPUNCTURE: CPT | Performed by: FAMILY MEDICINE

## 2023-02-07 LAB
CHOLEST SERPL-MCNC: 218 MG/DL
HDLC SERPL-MCNC: 39 MG/DL (ref 40–60)
HDLC SERPL: 5.6
LDLC SERPL CALC-MCNC: 153.8 MG/DL
TRIGL SERPL-MCNC: 126 MG/DL (ref 35–150)
VLDLC SERPL CALC-MCNC: 25.2 MG/DL (ref 6–23)

## 2023-02-15 ENCOUNTER — OFFICE VISIT (OUTPATIENT)
Dept: FAMILY MEDICINE CLINIC | Facility: CLINIC | Age: 38
End: 2023-02-15
Payer: COMMERCIAL

## 2023-02-15 VITALS
DIASTOLIC BLOOD PRESSURE: 80 MMHG | OXYGEN SATURATION: 98 % | BODY MASS INDEX: 29.73 KG/M2 | HEART RATE: 72 BPM | WEIGHT: 212.4 LBS | SYSTOLIC BLOOD PRESSURE: 120 MMHG | HEIGHT: 71 IN

## 2023-02-15 DIAGNOSIS — L21.9 SEBORRHEA: ICD-10-CM

## 2023-02-15 DIAGNOSIS — R79.89 ELEVATED LFTS: ICD-10-CM

## 2023-02-15 DIAGNOSIS — F98.8 ATTENTION DEFICIT DISORDER (ADD) WITHOUT HYPERACTIVITY: ICD-10-CM

## 2023-02-15 DIAGNOSIS — E78.00 PURE HYPERCHOLESTEROLEMIA: Primary | ICD-10-CM

## 2023-02-15 DIAGNOSIS — F41.9 ANXIETY: ICD-10-CM

## 2023-02-15 DIAGNOSIS — R10.11 RIGHT UPPER QUADRANT ABDOMINAL PAIN: ICD-10-CM

## 2023-02-15 PROCEDURE — 99214 OFFICE O/P EST MOD 30 MIN: CPT | Performed by: FAMILY MEDICINE

## 2023-02-15 SDOH — ECONOMIC STABILITY: HOUSING INSECURITY
IN THE LAST 12 MONTHS, WAS THERE A TIME WHEN YOU DID NOT HAVE A STEADY PLACE TO SLEEP OR SLEPT IN A SHELTER (INCLUDING NOW)?: NO

## 2023-02-15 SDOH — ECONOMIC STABILITY: INCOME INSECURITY: HOW HARD IS IT FOR YOU TO PAY FOR THE VERY BASICS LIKE FOOD, HOUSING, MEDICAL CARE, AND HEATING?: NOT HARD AT ALL

## 2023-02-15 SDOH — ECONOMIC STABILITY: FOOD INSECURITY: WITHIN THE PAST 12 MONTHS, THE FOOD YOU BOUGHT JUST DIDN'T LAST AND YOU DIDN'T HAVE MONEY TO GET MORE.: NEVER TRUE

## 2023-02-15 SDOH — ECONOMIC STABILITY: FOOD INSECURITY: WITHIN THE PAST 12 MONTHS, YOU WORRIED THAT YOUR FOOD WOULD RUN OUT BEFORE YOU GOT MONEY TO BUY MORE.: NEVER TRUE

## 2023-02-15 ASSESSMENT — ANXIETY QUESTIONNAIRES
6. BECOMING EASILY ANNOYED OR IRRITABLE: 0
3. WORRYING TOO MUCH ABOUT DIFFERENT THINGS: 0
1. FEELING NERVOUS, ANXIOUS, OR ON EDGE: 1
2. NOT BEING ABLE TO STOP OR CONTROL WORRYING: 0
5. BEING SO RESTLESS THAT IT IS HARD TO SIT STILL: 0
7. FEELING AFRAID AS IF SOMETHING AWFUL MIGHT HAPPEN: 0

## 2023-02-15 ASSESSMENT — PATIENT HEALTH QUESTIONNAIRE - PHQ9
SUM OF ALL RESPONSES TO PHQ QUESTIONS 1-9: 0
2. FEELING DOWN, DEPRESSED OR HOPELESS: 0
SUM OF ALL RESPONSES TO PHQ QUESTIONS 1-9: 0
SUM OF ALL RESPONSES TO PHQ9 QUESTIONS 1 & 2: 0
1. LITTLE INTEREST OR PLEASURE IN DOING THINGS: 0

## 2023-02-15 NOTE — PROGRESS NOTES
SUBJECTIVE:   Matty Eaton is a 30 y.o. male who has a past medical history significant for high cholesterol, anxiety and ADD. Previously the patient had been expressing persistent abdominal pain particularly in the right upper quadrant. Work-up had included labs and imaging. Imaging was unremarkable. Labs showed an increase in liver enzymes. Hepatitis panel was negative. Repeat liver enzymes showed normalization. He was referred to GI because of persistent symptoms but ultimately these resolved before he saw GI and canceled the appointment. There was thought that he might of had a stone in the common bile duct despite a ultrasound that did not show evidence of this. In addition he has had issues with seborrhea treated with Nizoral shampoo. This continues to do well. In addition the patient reports he is doing well regarding his anxiety and ADD and follows up with psychiatry. HPI  See above    Past Medical History, Past Surgical History, Family history, Social History, and Medications were all reviewed with the patient today and updated as necessary. Current Outpatient Medications   Medication Sig Dispense Refill    ketoconazole (NIZORAL) 2 % shampoo Apply topically daily as needed. 1 each 3    methylphenidate (CONCERTA) 36 MG extended release tablet TAKE 1 TABLET BY MOUTH EVERY MORNING AND 1 TABLET EVERY EVENING       No current facility-administered medications for this visit.      Allergies   Allergen Reactions    Latex     Covid-19 (Mrna) Vaccine Nausea And Vomiting     NOT ALLERGIC WAS ABLE TO GET SECOND DOSE    Penicillins Rash     Patient Active Problem List   Diagnosis    ADD (attention deficit disorder)    Anxiety     Past Medical History:   Diagnosis Date    ADD (attention deficit disorder)     Anxiety      Past Surgical History:   Procedure Laterality Date    CHOLECYSTECTOMY      LAP,CHOLECYSTECTOMY       Family History   Problem Relation Age of Onset    High Cholesterol Father     Hypertension Father      Social History     Tobacco Use    Smoking status: Never    Smokeless tobacco: Never   Substance Use Topics    Alcohol use: Yes         Review of Systems  See above    OBJECTIVE:  /80   Pulse 72   Ht 5' 11\" (1.803 m)   Wt 212 lb 6.4 oz (96.3 kg)   SpO2 98%   BMI 29.62 kg/m²      Physical Exam  Constitutional:       General: He is not in acute distress. Appearance: Normal appearance. He is not ill-appearing. HENT:      Head: Normocephalic and atraumatic. Cardiovascular:      Rate and Rhythm: Normal rate and regular rhythm. Heart sounds: Normal heart sounds. No murmur heard. Pulmonary:      Effort: Pulmonary effort is normal.      Breath sounds: Normal breath sounds. No wheezing or rhonchi. Musculoskeletal:         General: Normal range of motion. Cervical back: Normal range of motion and neck supple. Right lower leg: No edema. Left lower leg: No edema. Skin:     General: Skin is warm. Findings: No rash. Neurological:      Mental Status: He is alert and oriented to person, place, and time. Psychiatric:         Mood and Affect: Mood normal.         Behavior: Behavior normal.         Thought Content: Thought content normal.         Judgment: Judgment normal.       Medical problems and test results were reviewed with the patient today. ASSESSMENT and PLAN    1. High cholesterol. LDL is 153. Previously 103. Dietary focus. Patient acknowledges poor habits. 2.  Seborrhea. Continue Nizoral shampoo. No complaints. 3.  Right upper quadrant abdominal pain. Negative work-up. Resolved. If recurs she will let me know. 4.  Elevated LFTs. Liver enzymes have normalized. 5.  Anxiety. Per psychiatry. 6.  ADD. As above. Elements of this note have been dictated using speech recognition software. As a result, errors of speech recognition may have occurred.

## 2023-03-06 ENCOUNTER — OFFICE VISIT (OUTPATIENT)
Dept: FAMILY MEDICINE CLINIC | Facility: CLINIC | Age: 38
End: 2023-03-06
Payer: COMMERCIAL

## 2023-03-06 VITALS
HEIGHT: 71 IN | OXYGEN SATURATION: 98 % | BODY MASS INDEX: 29.4 KG/M2 | HEART RATE: 65 BPM | WEIGHT: 210 LBS | SYSTOLIC BLOOD PRESSURE: 120 MMHG | DIASTOLIC BLOOD PRESSURE: 80 MMHG

## 2023-03-06 DIAGNOSIS — H92.01 RIGHT EAR PAIN: ICD-10-CM

## 2023-03-06 DIAGNOSIS — S09.91XA INJURY OF RIGHT EAR, INITIAL ENCOUNTER: Primary | ICD-10-CM

## 2023-03-06 PROCEDURE — 99213 OFFICE O/P EST LOW 20 MIN: CPT | Performed by: FAMILY MEDICINE

## 2023-03-06 RX ORDER — DOXYCYCLINE 100 MG/1
100 TABLET ORAL 2 TIMES DAILY
Qty: 14 TABLET | Refills: 0 | Status: SHIPPED | OUTPATIENT
Start: 2023-03-06 | End: 2023-03-13

## 2023-03-06 ASSESSMENT — PATIENT HEALTH QUESTIONNAIRE - PHQ9
SUM OF ALL RESPONSES TO PHQ QUESTIONS 1-9: 0
SUM OF ALL RESPONSES TO PHQ9 QUESTIONS 1 & 2: 0
SUM OF ALL RESPONSES TO PHQ QUESTIONS 1-9: 0
2. FEELING DOWN, DEPRESSED OR HOPELESS: 0
1. LITTLE INTEREST OR PLEASURE IN DOING THINGS: 0
SUM OF ALL RESPONSES TO PHQ QUESTIONS 1-9: 0
SUM OF ALL RESPONSES TO PHQ QUESTIONS 1-9: 0

## 2023-03-06 NOTE — PROGRESS NOTES
SUBJECTIVE:   Allison Cunningham is a 30 y.o. male who has a past medical history significant for high cholesterol, anxiety and ADD. Patient presents today reporting that he has been doing martial arts for several months. He has been noticing that his right ear has recently become tender and swollen. This has been going on for about 3 days. He does not wear hearing protection/ear protection are soft helmet when sparring. He acknowledges that he \"gets rubbed on the ground a lot\". No hearing loss. No fever. HPI  See above    Past Medical History, Past Surgical History, Family history, Social History, and Medications were all reviewed with the patient today and updated as necessary. Current Outpatient Medications   Medication Sig Dispense Refill    ketoconazole (NIZORAL) 2 % shampoo Apply topically daily as needed. 1 each 3    methylphenidate (CONCERTA) 36 MG extended release tablet TAKE 1 TABLET BY MOUTH EVERY MORNING AND 1 TABLET EVERY EVENING       No current facility-administered medications for this visit.      Allergies   Allergen Reactions    Latex     Covid-19 (Mrna) Vaccine Nausea And Vomiting     NOT ALLERGIC WAS ABLE TO GET SECOND DOSE    Penicillins Rash     Patient Active Problem List   Diagnosis    ADD (attention deficit disorder)    Anxiety     Past Medical History:   Diagnosis Date    ADD (attention deficit disorder)     Anxiety      Past Surgical History:   Procedure Laterality Date    CHOLECYSTECTOMY      LAP,CHOLECYSTECTOMY       Family History   Problem Relation Age of Onset    High Cholesterol Father     Hypertension Father      Social History     Tobacco Use    Smoking status: Never    Smokeless tobacco: Never   Substance Use Topics    Alcohol use: Yes         Review of Systems  See above    OBJECTIVE:  /80   Pulse 65   Ht 5' 11\" (1.803 m)   Wt 210 lb (95.3 kg)   SpO2 98%   BMI 29.29 kg/m²      Physical Exam  Constitutional:       General: He is not in acute distress. Appearance: Normal appearance. He is not ill-appearing. HENT:      Head: Normocephalic and atraumatic. Ears:      Comments: Examination of the patient's right ear reveals soft tissue edema and erythema of the entire auricle. There is a little bit of warmth. External canal is not affected. Cardiovascular:      Rate and Rhythm: Normal rate and regular rhythm. Heart sounds: Normal heart sounds. No murmur heard. Pulmonary:      Effort: Pulmonary effort is normal.      Breath sounds: Normal breath sounds. No wheezing or rhonchi. Musculoskeletal:         General: Normal range of motion. Cervical back: Normal range of motion and neck supple. Right lower leg: No edema. Left lower leg: No edema. Skin:     General: Skin is warm. Findings: No rash. Neurological:      Mental Status: He is alert and oriented to person, place, and time. Psychiatric:         Mood and Affect: Mood normal.         Behavior: Behavior normal.         Thought Content: Thought content normal.         Judgment: Judgment normal.       Medical problems and test results were reviewed with the patient today. ASSESSMENT and PLAN    1. Right ear injury. Appears to be wrestlers ear. Discussed with the patient the implications of cauliflower ear and its permanency. Perhaps this is a contusion. Instructed him to avoid wrestling for 2 to 3 weeks. He will use moist compresses to his right ear to increase blood flow. Will cover for potential secondary infection with doxycycline 100 mg twice a day for a week. If the swelling goes down and he wants to return to wrestling strongly encouraged him to wear ear protection. 2.  Right wrestlers ear/ear pain. As above. Elements of this note have been dictated using speech recognition software. As a result, errors of speech recognition may have occurred.

## 2023-09-14 ENCOUNTER — OFFICE VISIT (OUTPATIENT)
Dept: FAMILY MEDICINE CLINIC | Facility: CLINIC | Age: 38
End: 2023-09-14
Payer: COMMERCIAL

## 2023-09-14 VITALS
DIASTOLIC BLOOD PRESSURE: 84 MMHG | HEIGHT: 71 IN | SYSTOLIC BLOOD PRESSURE: 128 MMHG | WEIGHT: 211.2 LBS | BODY MASS INDEX: 29.57 KG/M2

## 2023-09-14 DIAGNOSIS — R14.0 ABDOMINAL BLOATING: ICD-10-CM

## 2023-09-14 DIAGNOSIS — R10.84 GENERALIZED ABDOMINAL PAIN: Primary | ICD-10-CM

## 2023-09-14 DIAGNOSIS — F41.9 ANXIETY: ICD-10-CM

## 2023-09-14 LAB
ALBUMIN SERPL-MCNC: 4.1 G/DL (ref 3.5–5)
ALBUMIN/GLOB SERPL: 1.1 (ref 0.4–1.6)
ALP SERPL-CCNC: 115 U/L (ref 50–136)
ALT SERPL-CCNC: 78 U/L (ref 12–65)
ANION GAP SERPL CALC-SCNC: 5 MMOL/L (ref 2–11)
AST SERPL-CCNC: 45 U/L (ref 15–37)
BASOPHILS # BLD: 0.1 K/UL (ref 0–0.2)
BASOPHILS NFR BLD: 1 % (ref 0–2)
BILIRUB SERPL-MCNC: 0.4 MG/DL (ref 0.2–1.1)
BUN SERPL-MCNC: 18 MG/DL (ref 6–23)
CALCIUM SERPL-MCNC: 9.4 MG/DL (ref 8.3–10.4)
CHLORIDE SERPL-SCNC: 106 MMOL/L (ref 101–110)
CO2 SERPL-SCNC: 28 MMOL/L (ref 21–32)
CREAT SERPL-MCNC: 1 MG/DL (ref 0.8–1.5)
DIFFERENTIAL METHOD BLD: ABNORMAL
EOSINOPHIL # BLD: 0.1 K/UL (ref 0–0.8)
EOSINOPHIL NFR BLD: 2 % (ref 0.5–7.8)
ERYTHROCYTE [DISTWIDTH] IN BLOOD BY AUTOMATED COUNT: 12.4 % (ref 11.9–14.6)
GLOBULIN SER CALC-MCNC: 3.6 G/DL (ref 2.8–4.5)
GLUCOSE SERPL-MCNC: 89 MG/DL (ref 65–100)
HCT VFR BLD AUTO: 47.4 % (ref 41.1–50.3)
HGB BLD-MCNC: 16.1 G/DL (ref 13.6–17.2)
IMM GRANULOCYTES # BLD AUTO: 0.3 K/UL (ref 0–0.5)
IMM GRANULOCYTES NFR BLD AUTO: 4 % (ref 0–5)
LYMPHOCYTES # BLD: 2.9 K/UL (ref 0.5–4.6)
LYMPHOCYTES NFR BLD: 36 % (ref 13–44)
MCH RBC QN AUTO: 28.7 PG (ref 26.1–32.9)
MCHC RBC AUTO-ENTMCNC: 34 G/DL (ref 31.4–35)
MCV RBC AUTO: 84.5 FL (ref 82–102)
MONOCYTES # BLD: 0.7 K/UL (ref 0.1–1.3)
MONOCYTES NFR BLD: 9 % (ref 4–12)
NEUTS SEG # BLD: 3.9 K/UL (ref 1.7–8.2)
NEUTS SEG NFR BLD: 48 % (ref 43–78)
NRBC # BLD: 0 K/UL (ref 0–0.2)
PLATELET # BLD AUTO: 236 K/UL (ref 150–450)
PMV BLD AUTO: 11 FL (ref 9.4–12.3)
POTASSIUM SERPL-SCNC: 4.2 MMOL/L (ref 3.5–5.1)
PROT SERPL-MCNC: 7.7 G/DL (ref 6.3–8.2)
RBC # BLD AUTO: 5.61 M/UL (ref 4.23–5.6)
SODIUM SERPL-SCNC: 139 MMOL/L (ref 133–143)
WBC # BLD AUTO: 8.1 K/UL (ref 4.3–11.1)

## 2023-09-14 PROCEDURE — 99213 OFFICE O/P EST LOW 20 MIN: CPT | Performed by: FAMILY MEDICINE

## 2023-09-14 RX ORDER — PANTOPRAZOLE SODIUM 40 MG/1
40 TABLET, DELAYED RELEASE ORAL DAILY
Qty: 30 TABLET | Refills: 3 | Status: SHIPPED | OUTPATIENT
Start: 2023-09-14

## 2023-09-14 ASSESSMENT — PATIENT HEALTH QUESTIONNAIRE - PHQ9
SUM OF ALL RESPONSES TO PHQ QUESTIONS 1-9: 0
2. FEELING DOWN, DEPRESSED OR HOPELESS: 0
SUM OF ALL RESPONSES TO PHQ QUESTIONS 1-9: 0
1. LITTLE INTEREST OR PLEASURE IN DOING THINGS: 0
SUM OF ALL RESPONSES TO PHQ9 QUESTIONS 1 & 2: 0

## 2023-10-05 RX ORDER — KETOCONAZOLE 20 MG/ML
SHAMPOO TOPICAL
Qty: 120 ML | Refills: 1 | Status: SHIPPED | OUTPATIENT
Start: 2023-10-05

## 2024-01-02 RX ORDER — KETOCONAZOLE 20 MG/ML
SHAMPOO TOPICAL
Qty: 120 ML | Refills: 1 | Status: SHIPPED | OUTPATIENT
Start: 2024-01-02

## 2024-01-03 ENCOUNTER — TELEPHONE (OUTPATIENT)
Dept: FAMILY MEDICINE CLINIC | Facility: CLINIC | Age: 39
End: 2024-01-03

## 2024-01-03 RX ORDER — KETOCONAZOLE 20 MG/ML
SHAMPOO TOPICAL
Qty: 120 ML | Refills: 1 | OUTPATIENT
Start: 2024-01-03

## 2024-01-03 NOTE — TELEPHONE ENCOUNTER
Patient has questions about the MMR titers drawn in 2022. Can you please return his call at [phone]?

## 2024-01-08 ENCOUNTER — TELEMEDICINE (OUTPATIENT)
Dept: FAMILY MEDICINE CLINIC | Facility: CLINIC | Age: 39
End: 2024-01-08
Payer: COMMERCIAL

## 2024-01-08 DIAGNOSIS — J20.8 ACUTE BACTERIAL BRONCHITIS: ICD-10-CM

## 2024-01-08 DIAGNOSIS — B96.89 ACUTE BACTERIAL BRONCHITIS: ICD-10-CM

## 2024-01-08 DIAGNOSIS — R05.1 ACUTE COUGH: Primary | ICD-10-CM

## 2024-01-08 PROCEDURE — 99213 OFFICE O/P EST LOW 20 MIN: CPT | Performed by: FAMILY MEDICINE

## 2024-01-08 RX ORDER — BENZONATATE 200 MG/1
200 CAPSULE ORAL 3 TIMES DAILY PRN
Qty: 30 CAPSULE | Refills: 0 | Status: SHIPPED | OUTPATIENT
Start: 2024-01-08

## 2024-01-08 RX ORDER — AZITHROMYCIN 250 MG/1
TABLET, FILM COATED ORAL
Qty: 6 TABLET | Refills: 0 | Status: SHIPPED | OUTPATIENT
Start: 2024-01-08

## 2024-01-08 ASSESSMENT — PATIENT HEALTH QUESTIONNAIRE - PHQ9
SUM OF ALL RESPONSES TO PHQ QUESTIONS 1-9: 0
SUM OF ALL RESPONSES TO PHQ QUESTIONS 1-9: 0
2. FEELING DOWN, DEPRESSED OR HOPELESS: 0
1. LITTLE INTEREST OR PLEASURE IN DOING THINGS: 0
SUM OF ALL RESPONSES TO PHQ QUESTIONS 1-9: 0
SUM OF ALL RESPONSES TO PHQ9 QUESTIONS 1 & 2: 0
SUM OF ALL RESPONSES TO PHQ QUESTIONS 1-9: 0

## 2024-01-08 NOTE — PROGRESS NOTES
SUBJECTIVE:   Lucien Drake Jr. is a 38 y.o. male presents today complaining of a 10-day history of productive cough, congestion head and chest and feeling poorly.  Was seen in urgent care center 3 days ago with a negative COVID and flu test.  Was placed on a combination of amoxicillin, prednisone and Zofran as needed for nausea.  Does not feel significantly better.  Reports no wheezing.  Reports continued tightness in his chest and \"a lot of mucus production\".    Virtual visit performed today through [employer] messaging.    Patient's vital signs were not performed as encounter was performed virtually.  Location of virtual visit was patient's home.      HPI  See above    Past Medical History, Past Surgical History, Family history, Social History, and Medications were all reviewed with the patient today and updated as necessary.       Current Outpatient Medications   Medication Sig Dispense Refill    ketoconazole (NIZORAL) 2 % shampoo Apply topically daily as needed. 120 mL 1    methylphenidate (CONCERTA) 36 MG extended release tablet TAKE 1 TABLET BY MOUTH EVERY MORNING AND 1 TABLET EVERY EVENING      pantoprazole (PROTONIX) 40 MG tablet Take 1 tablet by mouth daily 30 tablet 3     No current facility-administered medications for this visit.     Allergies   Allergen Reactions    Latex     Covid-19 (Mrna) Vaccine Nausea And Vomiting     NOT ALLERGIC WAS ABLE TO GET SECOND DOSE    Penicillins Rash     Patient Active Problem List   Diagnosis    ADD (attention deficit disorder)    Anxiety     Past Medical History:   Diagnosis Date    ADD (attention deficit disorder)     Anxiety      Past Surgical History:   Procedure Laterality Date    CHOLECYSTECTOMY      LAP,CHOLECYSTECTOMY       Family History   Problem Relation Age of Onset    High Cholesterol Father     Hypertension Father      Social History     Tobacco Use    Smoking status: Never    Smokeless tobacco: Never   Substance Use Topics    Alcohol use: Yes

## 2024-05-13 ENCOUNTER — OFFICE VISIT (OUTPATIENT)
Dept: FAMILY MEDICINE CLINIC | Facility: CLINIC | Age: 39
End: 2024-05-13
Payer: COMMERCIAL

## 2024-05-13 VITALS
HEIGHT: 71 IN | BODY MASS INDEX: 29.46 KG/M2 | SYSTOLIC BLOOD PRESSURE: 126 MMHG | DIASTOLIC BLOOD PRESSURE: 74 MMHG | OXYGEN SATURATION: 98 %

## 2024-05-13 DIAGNOSIS — F41.9 ANXIETY: ICD-10-CM

## 2024-05-13 DIAGNOSIS — R10.31 INGUINAL PAIN OF BOTH SIDES: Primary | ICD-10-CM

## 2024-05-13 DIAGNOSIS — R10.32 INGUINAL PAIN OF BOTH SIDES: Primary | ICD-10-CM

## 2024-05-13 PROCEDURE — 99213 OFFICE O/P EST LOW 20 MIN: CPT | Performed by: FAMILY MEDICINE

## 2024-05-13 RX ORDER — DICLOFENAC SODIUM 75 MG/1
75 TABLET, DELAYED RELEASE ORAL 2 TIMES DAILY PRN
Qty: 60 TABLET | Refills: 0 | Status: SHIPPED | OUTPATIENT
Start: 2024-05-13

## 2024-05-13 SDOH — ECONOMIC STABILITY: FOOD INSECURITY: WITHIN THE PAST 12 MONTHS, YOU WORRIED THAT YOUR FOOD WOULD RUN OUT BEFORE YOU GOT MONEY TO BUY MORE.: NEVER TRUE

## 2024-05-13 SDOH — ECONOMIC STABILITY: FOOD INSECURITY: WITHIN THE PAST 12 MONTHS, THE FOOD YOU BOUGHT JUST DIDN'T LAST AND YOU DIDN'T HAVE MONEY TO GET MORE.: NEVER TRUE

## 2024-05-13 SDOH — ECONOMIC STABILITY: INCOME INSECURITY: HOW HARD IS IT FOR YOU TO PAY FOR THE VERY BASICS LIKE FOOD, HOUSING, MEDICAL CARE, AND HEATING?: NOT HARD AT ALL

## 2024-05-13 ASSESSMENT — PATIENT HEALTH QUESTIONNAIRE - PHQ9
SUM OF ALL RESPONSES TO PHQ9 QUESTIONS 1 & 2: 0
SUM OF ALL RESPONSES TO PHQ QUESTIONS 1-9: 0
SUM OF ALL RESPONSES TO PHQ QUESTIONS 1-9: 0
2. FEELING DOWN, DEPRESSED OR HOPELESS: NOT AT ALL
SUM OF ALL RESPONSES TO PHQ QUESTIONS 1-9: 0
SUM OF ALL RESPONSES TO PHQ QUESTIONS 1-9: 0
1. LITTLE INTEREST OR PLEASURE IN DOING THINGS: NOT AT ALL

## 2024-05-13 NOTE — PROGRESS NOTES
SUBJECTIVE:   Lucien Drake Jr. is a 38 y.o. male who has a past medical history significant for generalized anxiety and ADD presents today reporting that he has been experiencing pain in his groin bilaterally left more prominent than right.  He is very physically active and participates in martial arts.  He is concerned he might have a hernia.  He states that he did do some heavy training recently and may have injured himself doing some stretches.  No back pain.  No testicular discomfort.  No bowel or bladder dysfunction.    HPI  See above    Past Medical History, Past Surgical History, Family history, Social History, and Medications were all reviewed with the patient today and updated as necessary.       Current Outpatient Medications   Medication Sig Dispense Refill    methylphenidate (CONCERTA) 36 MG extended release tablet TAKE 1 TABLET BY MOUTH EVERY MORNING AND 1 TABLET EVERY EVENING      pantoprazole (PROTONIX) 40 MG tablet Take 1 tablet by mouth daily 30 tablet 3     No current facility-administered medications for this visit.     Allergies   Allergen Reactions    Latex     Covid-19 (Mrna) Vaccine Nausea And Vomiting     NOT ALLERGIC WAS ABLE TO GET SECOND DOSE    Penicillins Rash     Patient Active Problem List   Diagnosis    ADD (attention deficit disorder)    Anxiety     Past Medical History:   Diagnosis Date    ADD (attention deficit disorder)     Anxiety      Past Surgical History:   Procedure Laterality Date    CHOLECYSTECTOMY      LAP,CHOLECYSTECTOMY       Family History   Problem Relation Age of Onset    High Cholesterol Father     Hypertension Father      Social History     Tobacco Use    Smoking status: Never    Smokeless tobacco: Never   Substance Use Topics    Alcohol use: Yes         Review of Systems  See above    OBJECTIVE:  /74   Ht 1.803 m (5' 11\")   SpO2 98%   BMI 29.46 kg/m²      Physical Exam  Constitutional:       General: He is not in acute distress.     Appearance:

## 2024-06-10 RX ORDER — DICLOFENAC SODIUM 75 MG/1
75 TABLET, DELAYED RELEASE ORAL 2 TIMES DAILY
Qty: 60 TABLET | Refills: 0 | OUTPATIENT
Start: 2024-06-10

## 2024-09-16 ENCOUNTER — OFFICE VISIT (OUTPATIENT)
Dept: FAMILY MEDICINE CLINIC | Facility: CLINIC | Age: 39
End: 2024-09-16
Payer: COMMERCIAL

## 2024-09-16 VITALS
BODY MASS INDEX: 27.86 KG/M2 | HEART RATE: 54 BPM | DIASTOLIC BLOOD PRESSURE: 68 MMHG | SYSTOLIC BLOOD PRESSURE: 102 MMHG | HEIGHT: 71 IN | WEIGHT: 199 LBS | OXYGEN SATURATION: 97 %

## 2024-09-16 DIAGNOSIS — M25.561 ACUTE PAIN OF RIGHT KNEE: Primary | ICD-10-CM

## 2024-09-16 DIAGNOSIS — M25.361 INSTABILITY OF RIGHT KNEE JOINT: ICD-10-CM

## 2024-09-16 PROBLEM — U07.1 COVID-19: Status: ACTIVE | Noted: 2023-09-09

## 2024-09-16 PROBLEM — F32.A DEPRESSION, UNSPECIFIED: Status: ACTIVE | Noted: 2024-09-16

## 2024-09-16 PROBLEM — R42 DIZZINESS AND GIDDINESS: Status: ACTIVE | Noted: 2019-07-23

## 2024-09-16 PROBLEM — K52.9 NONINFECTIOUS GASTROENTERITIS: Status: ACTIVE | Noted: 2022-10-28

## 2024-09-16 PROBLEM — S61.419A LACERATION OF HAND: Status: ACTIVE | Noted: 2018-03-22

## 2024-09-16 PROBLEM — R11.2 NAUSEA, VOMITING, AND DIARRHEA: Status: ACTIVE | Noted: 2024-01-04

## 2024-09-16 PROBLEM — M54.17 RADICULOPATHY, LUMBOSACRAL REGION: Status: ACTIVE | Noted: 2023-08-17

## 2024-09-16 PROBLEM — M54.16 LUMBAR RADICULOPATHY: Status: ACTIVE | Noted: 2017-05-24

## 2024-09-16 PROBLEM — M54.50 LOW BACK PAIN, UNSPECIFIED: Status: ACTIVE | Noted: 2024-09-16

## 2024-09-16 PROBLEM — R19.7 NAUSEA, VOMITING, AND DIARRHEA: Status: ACTIVE | Noted: 2024-01-04

## 2024-09-16 PROBLEM — R03.0 ELEVATED BLOOD-PRESSURE READING, WITHOUT DIAGNOSIS OF HYPERTENSION: Status: ACTIVE | Noted: 2024-01-04

## 2024-09-16 PROBLEM — J01.00 ACUTE MAXILLARY SINUSITIS, UNSPECIFIED: Status: ACTIVE | Noted: 2024-01-04

## 2024-09-16 PROBLEM — R21 RASH AND OTHER NONSPECIFIC SKIN ERUPTION: Status: ACTIVE | Noted: 2023-06-13

## 2024-09-16 PROCEDURE — 99213 OFFICE O/P EST LOW 20 MIN: CPT | Performed by: NURSE PRACTITIONER

## 2024-09-16 RX ORDER — METHYLPHENIDATE HYDROCHLORIDE 36 MG/1
36 TABLET ORAL 2 TIMES DAILY
COMMUNITY
End: 2024-09-16

## 2024-09-16 RX ORDER — DICLOFENAC SODIUM 75 MG/1
75 TABLET, DELAYED RELEASE ORAL 2 TIMES DAILY PRN
Qty: 28 TABLET | Refills: 0 | Status: SHIPPED | OUTPATIENT
Start: 2024-09-16 | End: 2024-09-30

## 2024-09-16 ASSESSMENT — ENCOUNTER SYMPTOMS
TROUBLE SWALLOWING: 0
COUGH: 0
SHORTNESS OF BREATH: 0
RESPIRATORY NEGATIVE: 1
ANAL BLEEDING: 0
RECTAL PAIN: 0
CHOKING: 0
SORE THROAT: 0
FACIAL SWELLING: 0
STRIDOR: 0
DIARRHEA: 0
EYE DISCHARGE: 0
APNEA: 0
RHINORRHEA: 0
CHEST TIGHTNESS: 0
GASTROINTESTINAL NEGATIVE: 1
WHEEZING: 0
EYES NEGATIVE: 1
COLOR CHANGE: 0
NAUSEA: 0
VOICE CHANGE: 0
ABDOMINAL DISTENTION: 0
VOMITING: 0
ALLERGIC/IMMUNOLOGIC NEGATIVE: 1
BLOOD IN STOOL: 0
EYE PAIN: 0
ABDOMINAL PAIN: 0
SINUS PRESSURE: 0
BACK PAIN: 0
SINUS PAIN: 0
CONSTIPATION: 0

## 2025-01-28 ENCOUNTER — OFFICE VISIT (OUTPATIENT)
Dept: FAMILY MEDICINE CLINIC | Facility: CLINIC | Age: 40
End: 2025-01-28

## 2025-01-28 VITALS
BODY MASS INDEX: 28.87 KG/M2 | WEIGHT: 207 LBS | HEART RATE: 94 BPM | SYSTOLIC BLOOD PRESSURE: 126 MMHG | DIASTOLIC BLOOD PRESSURE: 78 MMHG | OXYGEN SATURATION: 98 %

## 2025-01-28 DIAGNOSIS — R52 BODY ACHES: Primary | ICD-10-CM

## 2025-01-28 DIAGNOSIS — R51.9 FACIAL PAIN: ICD-10-CM

## 2025-01-28 DIAGNOSIS — J10.1 INFLUENZA A: ICD-10-CM

## 2025-01-28 DIAGNOSIS — R51.9 HEADACHE, UNSPECIFIED HEADACHE TYPE: ICD-10-CM

## 2025-01-28 LAB
EXP DATE SOLUTION: NORMAL
EXP DATE SWAB: NORMAL
EXPIRATION DATE: NORMAL
INFLUENZA A ANTIGEN, POC: POSITIVE
INFLUENZA B ANTIGEN, POC: NEGATIVE
LOT NUMBER POC: NORMAL
LOT NUMBER SOLUTION: NORMAL
LOT NUMBER SWAB: NORMAL
SARS-COV-2 RNA, POC: NEGATIVE
VALID INTERNAL CONTROL, POC: YES

## 2025-01-28 RX ORDER — OSELTAMIVIR PHOSPHATE 75 MG/1
75 CAPSULE ORAL 2 TIMES DAILY
Qty: 10 CAPSULE | Refills: 0 | Status: SHIPPED | OUTPATIENT
Start: 2025-01-28 | End: 2025-02-02

## 2025-01-28 SDOH — ECONOMIC STABILITY: FOOD INSECURITY: WITHIN THE PAST 12 MONTHS, THE FOOD YOU BOUGHT JUST DIDN'T LAST AND YOU DIDN'T HAVE MONEY TO GET MORE.: NEVER TRUE

## 2025-01-28 SDOH — ECONOMIC STABILITY: FOOD INSECURITY: WITHIN THE PAST 12 MONTHS, YOU WORRIED THAT YOUR FOOD WOULD RUN OUT BEFORE YOU GOT MONEY TO BUY MORE.: NEVER TRUE

## 2025-01-28 ASSESSMENT — PATIENT HEALTH QUESTIONNAIRE - PHQ9
SUM OF ALL RESPONSES TO PHQ QUESTIONS 1-9: 0
SUM OF ALL RESPONSES TO PHQ QUESTIONS 1-9: 0
4. FEELING TIRED OR HAVING LITTLE ENERGY: NOT AT ALL
7. TROUBLE CONCENTRATING ON THINGS, SUCH AS READING THE NEWSPAPER OR WATCHING TELEVISION: NOT AT ALL
9. THOUGHTS THAT YOU WOULD BE BETTER OFF DEAD, OR OF HURTING YOURSELF: NOT AT ALL
8. MOVING OR SPEAKING SO SLOWLY THAT OTHER PEOPLE COULD HAVE NOTICED. OR THE OPPOSITE, BEING SO FIGETY OR RESTLESS THAT YOU HAVE BEEN MOVING AROUND A LOT MORE THAN USUAL: NOT AT ALL
10. IF YOU CHECKED OFF ANY PROBLEMS, HOW DIFFICULT HAVE THESE PROBLEMS MADE IT FOR YOU TO DO YOUR WORK, TAKE CARE OF THINGS AT HOME, OR GET ALONG WITH OTHER PEOPLE: NOT DIFFICULT AT ALL
SUM OF ALL RESPONSES TO PHQ9 QUESTIONS 1 & 2: 0
SUM OF ALL RESPONSES TO PHQ QUESTIONS 1-9: 0
3. TROUBLE FALLING OR STAYING ASLEEP: NOT AT ALL
1. LITTLE INTEREST OR PLEASURE IN DOING THINGS: NOT AT ALL
2. FEELING DOWN, DEPRESSED OR HOPELESS: NOT AT ALL
SUM OF ALL RESPONSES TO PHQ QUESTIONS 1-9: 0
6. FEELING BAD ABOUT YOURSELF - OR THAT YOU ARE A FAILURE OR HAVE LET YOURSELF OR YOUR FAMILY DOWN: NOT AT ALL
5. POOR APPETITE OR OVEREATING: NOT AT ALL

## 2025-01-28 NOTE — PROGRESS NOTES
SUBJECTIVE:   Lucien Drake Jr. is a 39 y.o. male ho has a past medical history significant for generalized anxiety and ADD.  Patient presents today reporting a 3-day history of head fullness, headache, body aches and head congestion.  Patient reports a little bit of a cough.  No chest pain or shortness of breath.  No fever.  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    HPI  See above    Past Medical History, Past Surgical History, Family history, Social History, and Medications were all reviewed with the patient today and updated as necessary.       Current Outpatient Medications   Medication Sig Dispense Refill    methylphenidate (CONCERTA) 36 MG extended release tablet TAKE 1 TABLET BY MOUTH EVERY MORNING AND 1 TABLET EVERY EVENING      diclofenac (VOLTAREN) 75 MG EC tablet Take 1 tablet by mouth 2 times daily as needed for Pain (take with food) (Patient not taking: Reported on 1/28/2025) 28 tablet 0    pantoprazole (PROTONIX) 40 MG tablet Take 1 tablet by mouth daily (Patient not taking: Reported on 9/16/2024) 30 tablet 3     No current facility-administered medications for this visit.     Allergies   Allergen Reactions    Latex     Covid-19 (Mrna) Vaccine Nausea And Vomiting     NOT ALLERGIC WAS ABLE TO GET SECOND DOSE    Flavoring Agent (Non-Screening)     Penicillin G     Pollen Extract     Doxycycline Nausea And Vomiting     Patient Active Problem List   Diagnosis    ADD (attention deficit disorder)    Anxiety    Acute maxillary sinusitis, unspecified    COVID-19    Depression, unspecified    Dizziness and giddiness    Elevated blood-pressure reading, without diagnosis of hypertension    Laceration of hand    Low back pain, unspecified    Lumbar radiculopathy    Radiculopathy, lumbosacral region    Nausea, vomiting, and diarrhea    Noninfectious gastroenteritis    Rash and other nonspecific skin eruption     Past Medical History:   Diagnosis Date    ADD (attention

## 2025-09-02 ENCOUNTER — TELEMEDICINE (OUTPATIENT)
Dept: FAMILY MEDICINE CLINIC | Facility: CLINIC | Age: 40
End: 2025-09-02
Payer: COMMERCIAL

## 2025-09-02 DIAGNOSIS — R05.8 DRY COUGH: ICD-10-CM

## 2025-09-02 DIAGNOSIS — R09.81 NASAL CONGESTION: ICD-10-CM

## 2025-09-02 DIAGNOSIS — R09.82 POST-NASAL DRIP: ICD-10-CM

## 2025-09-02 DIAGNOSIS — R51.9 SINUS HEADACHE: ICD-10-CM

## 2025-09-02 DIAGNOSIS — U07.1 COVID-19: Primary | ICD-10-CM

## 2025-09-02 DIAGNOSIS — R53.81 MALAISE: ICD-10-CM

## 2025-09-02 PROCEDURE — 99213 OFFICE O/P EST LOW 20 MIN: CPT | Performed by: NURSE PRACTITIONER

## 2025-09-02 RX ORDER — BENZONATATE 100 MG/1
100 CAPSULE ORAL 3 TIMES DAILY PRN
Qty: 30 CAPSULE | Refills: 0 | Status: SHIPPED | OUTPATIENT
Start: 2025-09-02 | End: 2025-09-12

## 2025-09-02 RX ORDER — PREDNISONE 20 MG/1
TABLET ORAL
Qty: 11 TABLET | Refills: 0 | Status: SHIPPED | OUTPATIENT
Start: 2025-09-02

## 2025-09-02 ASSESSMENT — ENCOUNTER SYMPTOMS
EYE DISCHARGE: 0
DIARRHEA: 0
ALLERGIC/IMMUNOLOGIC NEGATIVE: 1
SORE THROAT: 1
WHEEZING: 0
PHOTOPHOBIA: 0
BLOOD IN STOOL: 0
EYES NEGATIVE: 1
APNEA: 0
SHORTNESS OF BREATH: 0
NAUSEA: 0
GASTROINTESTINAL NEGATIVE: 1
COUGH: 1
BACK PAIN: 0
ANAL BLEEDING: 0
CHEST TIGHTNESS: 0
SINUS PRESSURE: 0
TROUBLE SWALLOWING: 0
SINUS PAIN: 0
EYE PAIN: 0
FACIAL SWELLING: 0
STRIDOR: 0
CONSTIPATION: 0
ABDOMINAL DISTENTION: 0
COLOR CHANGE: 0
RECTAL PAIN: 0
ABDOMINAL PAIN: 0
CHOKING: 0
VOMITING: 0
RHINORRHEA: 1
VOICE CHANGE: 0

## 2025-09-04 ENCOUNTER — TELEPHONE (OUTPATIENT)
Dept: FAMILY MEDICINE CLINIC | Facility: CLINIC | Age: 40
End: 2025-09-04

## 2025-09-04 RX ORDER — ONDANSETRON 4 MG/1
4 TABLET, ORALLY DISINTEGRATING ORAL 3 TIMES DAILY PRN
Qty: 30 TABLET | Refills: 0 | Status: SHIPPED | OUTPATIENT
Start: 2025-09-04